# Patient Record
Sex: MALE | Race: WHITE | Employment: FULL TIME | URBAN - NONMETROPOLITAN AREA
[De-identification: names, ages, dates, MRNs, and addresses within clinical notes are randomized per-mention and may not be internally consistent; named-entity substitution may affect disease eponyms.]

---

## 2017-02-08 ENCOUNTER — OFFICE VISIT (OUTPATIENT)
Dept: PSYCHIATRY | Age: 57
End: 2017-02-08
Payer: COMMERCIAL

## 2017-02-08 VITALS
HEART RATE: 93 BPM | DIASTOLIC BLOOD PRESSURE: 67 MMHG | OXYGEN SATURATION: 95 % | HEIGHT: 67 IN | BODY MASS INDEX: 33.26 KG/M2 | SYSTOLIC BLOOD PRESSURE: 119 MMHG | WEIGHT: 211.9 LBS

## 2017-02-08 DIAGNOSIS — F31.81 BIPOLAR 2 DISORDER, MAJOR DEPRESSIVE EPISODE (HCC): Primary | ICD-10-CM

## 2017-02-08 PROCEDURE — 99214 OFFICE O/P EST MOD 30 MIN: CPT | Performed by: PSYCHIATRY & NEUROLOGY

## 2017-02-08 PROCEDURE — 90833 PSYTX W PT W E/M 30 MIN: CPT | Performed by: PSYCHIATRY & NEUROLOGY

## 2017-02-08 RX ORDER — VENLAFAXINE HYDROCHLORIDE 150 MG/1
150 CAPSULE, EXTENDED RELEASE ORAL DAILY
Qty: 30 CAPSULE | Refills: 3 | Status: SHIPPED | OUTPATIENT
Start: 2017-02-08 | End: 2017-08-16 | Stop reason: SDUPTHER

## 2017-03-31 DIAGNOSIS — F33.41 RECURRENT MAJOR DEPRESSION IN PARTIAL REMISSION (HCC): ICD-10-CM

## 2017-04-03 RX ORDER — LAMOTRIGINE 150 MG/1
TABLET ORAL
Qty: 60 TABLET | Refills: 2 | Status: SHIPPED | OUTPATIENT
Start: 2017-04-03 | End: 2017-08-17 | Stop reason: SDUPTHER

## 2017-04-05 RX ORDER — RISPERIDONE 1 MG/1
TABLET, FILM COATED ORAL
Qty: 60 TABLET | Refills: 2 | Status: SHIPPED | OUTPATIENT
Start: 2017-04-05 | End: 2017-08-17 | Stop reason: SDUPTHER

## 2017-05-08 ENCOUNTER — OFFICE VISIT (OUTPATIENT)
Dept: PSYCHIATRY | Age: 57
End: 2017-05-08
Payer: COMMERCIAL

## 2017-05-08 VITALS
DIASTOLIC BLOOD PRESSURE: 56 MMHG | HEIGHT: 67 IN | WEIGHT: 205 LBS | HEART RATE: 83 BPM | BODY MASS INDEX: 32.18 KG/M2 | SYSTOLIC BLOOD PRESSURE: 128 MMHG | OXYGEN SATURATION: 96 %

## 2017-05-08 DIAGNOSIS — F33.2 SEVERE EPISODE OF RECURRENT MAJOR DEPRESSIVE DISORDER, WITHOUT PSYCHOTIC FEATURES (HCC): Primary | ICD-10-CM

## 2017-05-08 DIAGNOSIS — F31.81 BIPOLAR 2 DISORDER, MAJOR DEPRESSIVE EPISODE (HCC): ICD-10-CM

## 2017-05-08 PROCEDURE — 90833 PSYTX W PT W E/M 30 MIN: CPT | Performed by: PSYCHIATRY & NEUROLOGY

## 2017-05-08 PROCEDURE — 99214 OFFICE O/P EST MOD 30 MIN: CPT | Performed by: PSYCHIATRY & NEUROLOGY

## 2017-05-08 RX ORDER — VENLAFAXINE HYDROCHLORIDE 150 MG/1
150 CAPSULE, EXTENDED RELEASE ORAL DAILY
Qty: 30 CAPSULE | Refills: 3 | Status: SHIPPED | OUTPATIENT
Start: 2017-05-08 | End: 2017-08-17 | Stop reason: DRUGHIGH

## 2017-08-16 ENCOUNTER — OFFICE VISIT (OUTPATIENT)
Dept: PSYCHIATRY | Age: 57
End: 2017-08-16
Payer: COMMERCIAL

## 2017-08-16 VITALS
WEIGHT: 206.5 LBS | SYSTOLIC BLOOD PRESSURE: 128 MMHG | HEART RATE: 77 BPM | HEIGHT: 67 IN | DIASTOLIC BLOOD PRESSURE: 75 MMHG | OXYGEN SATURATION: 98 % | BODY MASS INDEX: 32.41 KG/M2

## 2017-08-16 DIAGNOSIS — F33.41 RECURRENT MAJOR DEPRESSION IN PARTIAL REMISSION (HCC): ICD-10-CM

## 2017-08-16 PROCEDURE — 90832 PSYTX W PT 30 MINUTES: CPT | Performed by: NURSE PRACTITIONER

## 2017-08-17 ENCOUNTER — TELEPHONE (OUTPATIENT)
Dept: PSYCHIATRY | Age: 57
End: 2017-08-17

## 2017-08-17 RX ORDER — VENLAFAXINE HYDROCHLORIDE 150 MG/1
CAPSULE, EXTENDED RELEASE ORAL
Qty: 30 CAPSULE | Refills: 2 | Status: SHIPPED | OUTPATIENT
Start: 2017-08-17 | End: 2017-10-16 | Stop reason: SDUPTHER

## 2017-08-17 RX ORDER — LAMOTRIGINE 150 MG/1
TABLET ORAL
Qty: 60 TABLET | Refills: 2 | Status: SHIPPED | OUTPATIENT
Start: 2017-08-17 | End: 2017-10-16 | Stop reason: SDUPTHER

## 2017-08-17 RX ORDER — VENLAFAXINE HYDROCHLORIDE 75 MG/1
CAPSULE, EXTENDED RELEASE ORAL
Qty: 30 CAPSULE | Refills: 2 | Status: SHIPPED | OUTPATIENT
Start: 2017-08-17 | End: 2017-10-16 | Stop reason: SDUPTHER

## 2017-08-17 RX ORDER — CLONAZEPAM 0.5 MG/1
TABLET ORAL
Qty: 30 TABLET | Refills: 0 | Status: SHIPPED | OUTPATIENT
Start: 2017-08-17 | End: 2017-10-16

## 2017-08-17 RX ORDER — RISPERIDONE 1 MG/1
TABLET, FILM COATED ORAL
Qty: 60 TABLET | Refills: 2 | Status: SHIPPED | OUTPATIENT
Start: 2017-08-17 | End: 2017-10-16 | Stop reason: SDUPTHER

## 2017-08-30 ENCOUNTER — TELEPHONE (OUTPATIENT)
Dept: PSYCHIATRY | Age: 57
End: 2017-08-30

## 2017-10-16 ENCOUNTER — OFFICE VISIT (OUTPATIENT)
Dept: PSYCHIATRY | Age: 57
End: 2017-10-16
Payer: COMMERCIAL

## 2017-10-16 VITALS
SYSTOLIC BLOOD PRESSURE: 121 MMHG | HEIGHT: 67 IN | BODY MASS INDEX: 33.02 KG/M2 | OXYGEN SATURATION: 94 % | DIASTOLIC BLOOD PRESSURE: 77 MMHG | HEART RATE: 87 BPM | WEIGHT: 210.4 LBS

## 2017-10-16 DIAGNOSIS — F33.41 RECURRENT MAJOR DEPRESSION IN PARTIAL REMISSION (HCC): ICD-10-CM

## 2017-10-16 PROCEDURE — 99999 PR OFFICE/OUTPT VISIT,PROCEDURE ONLY: CPT | Performed by: NURSE PRACTITIONER

## 2017-10-16 PROCEDURE — 90832 PSYTX W PT 30 MINUTES: CPT | Performed by: NURSE PRACTITIONER

## 2017-10-16 RX ORDER — VENLAFAXINE HYDROCHLORIDE 150 MG/1
CAPSULE, EXTENDED RELEASE ORAL
Qty: 30 CAPSULE | Refills: 2 | Status: SHIPPED | OUTPATIENT
Start: 2017-10-16 | End: 2018-02-23 | Stop reason: SDUPTHER

## 2017-10-16 RX ORDER — RISPERIDONE 1 MG/1
TABLET, FILM COATED ORAL
Qty: 60 TABLET | Refills: 2 | Status: SHIPPED | OUTPATIENT
Start: 2017-10-16 | End: 2018-01-11 | Stop reason: SDUPTHER

## 2017-10-16 RX ORDER — VENLAFAXINE HYDROCHLORIDE 75 MG/1
CAPSULE, EXTENDED RELEASE ORAL
Qty: 30 CAPSULE | Refills: 2 | Status: SHIPPED | OUTPATIENT
Start: 2017-10-16 | End: 2018-02-23 | Stop reason: SDUPTHER

## 2017-10-16 RX ORDER — LAMOTRIGINE 150 MG/1
TABLET ORAL
Qty: 60 TABLET | Refills: 2 | Status: SHIPPED | OUTPATIENT
Start: 2017-10-16 | End: 2018-02-23 | Stop reason: SDUPTHER

## 2018-01-11 RX ORDER — RISPERIDONE 1 MG/1
TABLET, FILM COATED ORAL
Qty: 60 TABLET | Refills: 2 | Status: SHIPPED | OUTPATIENT
Start: 2018-01-11 | End: 2018-02-23 | Stop reason: SDUPTHER

## 2018-01-11 NOTE — TELEPHONE ENCOUNTER
Lamictal 150 mg bid, Risperdal 0.5 mg qam, 1.5 tabs qhs, d/c Klonopin  1. The risks, benefits, side effects, indications, contraindications, and adverse effects of the medications have been discussed. yes   2. The pt has verbalized understanding and has capacity to give informed consent. 3. The Liza Rhiannon report has been reviewed according to [facility] regulations. 4. Supportive therapy offered. 5. Follow up: Return in 3 months  6. The patient has been advised to call with any problems. 7. Controlled substance Treatment Plan: No Rx. 8. The above listed medications have been continued, modifications in meds and other orders/labs as follows:   9.  Additional comments:

## 2018-02-23 ENCOUNTER — OFFICE VISIT (OUTPATIENT)
Dept: PSYCHIATRY | Age: 58
End: 2018-02-23
Payer: COMMERCIAL

## 2018-02-23 VITALS
HEIGHT: 67 IN | HEART RATE: 84 BPM | SYSTOLIC BLOOD PRESSURE: 114 MMHG | OXYGEN SATURATION: 98 % | WEIGHT: 213 LBS | BODY MASS INDEX: 33.43 KG/M2 | DIASTOLIC BLOOD PRESSURE: 72 MMHG

## 2018-02-23 DIAGNOSIS — F31.81 BIPOLAR 2 DISORDER, MAJOR DEPRESSIVE EPISODE (HCC): Primary | ICD-10-CM

## 2018-02-23 DIAGNOSIS — F33.41 RECURRENT MAJOR DEPRESSION IN PARTIAL REMISSION (HCC): ICD-10-CM

## 2018-02-23 PROCEDURE — 99214 OFFICE O/P EST MOD 30 MIN: CPT | Performed by: NURSE PRACTITIONER

## 2018-02-23 RX ORDER — RISPERIDONE 1 MG/1
TABLET, FILM COATED ORAL
Qty: 60 TABLET | Refills: 2 | Status: SHIPPED | OUTPATIENT
Start: 2018-02-23 | End: 2018-07-26 | Stop reason: SDUPTHER

## 2018-02-23 RX ORDER — VENLAFAXINE HYDROCHLORIDE 150 MG/1
CAPSULE, EXTENDED RELEASE ORAL
Qty: 30 CAPSULE | Refills: 2 | Status: SHIPPED | OUTPATIENT
Start: 2018-02-23 | End: 2018-04-10 | Stop reason: DRUGHIGH

## 2018-02-23 RX ORDER — LAMOTRIGINE 150 MG/1
TABLET ORAL
Qty: 60 TABLET | Refills: 2 | Status: SHIPPED | OUTPATIENT
Start: 2018-02-23 | End: 2018-07-26 | Stop reason: SDUPTHER

## 2018-02-23 RX ORDER — VENLAFAXINE HYDROCHLORIDE 75 MG/1
CAPSULE, EXTENDED RELEASE ORAL
Qty: 30 CAPSULE | Refills: 2 | Status: SHIPPED | OUTPATIENT
Start: 2018-02-23 | End: 2018-04-10 | Stop reason: DRUGHIGH

## 2018-04-10 ENCOUNTER — OFFICE VISIT (OUTPATIENT)
Dept: PSYCHIATRY | Age: 58
End: 2018-04-10
Payer: COMMERCIAL

## 2018-04-10 VITALS
BODY MASS INDEX: 31.39 KG/M2 | SYSTOLIC BLOOD PRESSURE: 131 MMHG | OXYGEN SATURATION: 96 % | HEIGHT: 67 IN | DIASTOLIC BLOOD PRESSURE: 82 MMHG | WEIGHT: 200 LBS | HEART RATE: 90 BPM

## 2018-04-10 DIAGNOSIS — F31.81 BIPOLAR 2 DISORDER, MAJOR DEPRESSIVE EPISODE (HCC): Primary | ICD-10-CM

## 2018-04-10 PROCEDURE — 99214 OFFICE O/P EST MOD 30 MIN: CPT | Performed by: NURSE PRACTITIONER

## 2018-04-10 RX ORDER — VENLAFAXINE HYDROCHLORIDE 150 MG/1
CAPSULE, EXTENDED RELEASE ORAL
Qty: 60 CAPSULE | Refills: 3 | Status: SHIPPED | OUTPATIENT
Start: 2018-04-10 | End: 2018-07-26 | Stop reason: SDUPTHER

## 2018-05-23 ENCOUNTER — OFFICE VISIT (OUTPATIENT)
Dept: PSYCHIATRY | Age: 58
End: 2018-05-23
Payer: COMMERCIAL

## 2018-05-23 VITALS
SYSTOLIC BLOOD PRESSURE: 128 MMHG | WEIGHT: 208 LBS | BODY MASS INDEX: 32.65 KG/M2 | HEIGHT: 67 IN | DIASTOLIC BLOOD PRESSURE: 78 MMHG | HEART RATE: 82 BPM | OXYGEN SATURATION: 93 %

## 2018-05-23 DIAGNOSIS — F32.A DEPRESSION, UNSPECIFIED DEPRESSION TYPE: ICD-10-CM

## 2018-05-23 DIAGNOSIS — F31.81 BIPOLAR 2 DISORDER, MAJOR DEPRESSIVE EPISODE (HCC): Primary | ICD-10-CM

## 2018-05-23 PROCEDURE — 99214 OFFICE O/P EST MOD 30 MIN: CPT | Performed by: NURSE PRACTITIONER

## 2018-07-26 DIAGNOSIS — F33.41 RECURRENT MAJOR DEPRESSION IN PARTIAL REMISSION (HCC): ICD-10-CM

## 2018-07-26 RX ORDER — RISPERIDONE 1 MG/1
TABLET, FILM COATED ORAL
Qty: 60 TABLET | Refills: 2 | Status: SHIPPED | OUTPATIENT
Start: 2018-07-26 | End: 2018-11-06 | Stop reason: SDUPTHER

## 2018-07-26 RX ORDER — LAMOTRIGINE 150 MG/1
TABLET ORAL
Qty: 60 TABLET | Refills: 2 | Status: SHIPPED | OUTPATIENT
Start: 2018-07-26 | End: 2018-11-06 | Stop reason: SDUPTHER

## 2018-07-26 RX ORDER — VENLAFAXINE HYDROCHLORIDE 150 MG/1
CAPSULE, EXTENDED RELEASE ORAL
Qty: 60 CAPSULE | Refills: 3 | Status: SHIPPED | OUTPATIENT
Start: 2018-07-26 | End: 2018-11-06 | Stop reason: SDUPTHER

## 2018-07-26 NOTE — TELEPHONE ENCOUNTER
Pt is needing a refill on the following; Pt was last seen on 05/23/18 and has a follow up on 09/04/18 7/26/2018 8:33 AM   Progress Note        Hugo Gama 1960  Psychotherapy Time Spent: 24 min      Psychotherapy Topics: family, health and occupational    Chief Complaint   Patient presents with    Medication Refill         Subjective:  Patient is a 57 yo CM diagnosed with bipolar 2 disorder and presents today for follow-up. Last seen in clinic on 4/10/18 and prior records were reviewed. Patient is accompanied by wife, Clara Ivan. Today patient states, \"things are improving somewhat. It's all better. \" Patient returns for repeat labwork in June. Reports improvement in energy and sleep since beginning Vitamin D and testosterone. Patient's family is also noticing improvement. Clara Ivan said patient's daughter texted her when they went horseback riding and said \"Dad's doing better today. \" Patient says his mood worsens during the fall/winter period. Furthermore, he gets laid off in December which becomes problematic with insurance. Patient is happy with where his mood and concentration are. Sleep is good. Appetite is stable. Patient reports side effects as follows: none. No evidence of EPS, no cogwheeling or abnormal motor movements. Absent  suicidal ideation. Reports compliance with medications as good . Review of Systems - 12 point review negative   History obtained via chart review and patient  PCP is Rodrick Rubio      Current Meds:    Prior to Admission medications    Medication Sig Start Date End Date Taking?  Authorizing Provider   venlafaxine (EFFEXOR XR) 150 MG extended release capsule Take 2 capsules by mouth daily 4/10/18   HUGO Agosto   lamoTRIgine (LAMICTAL) 150 MG tablet Take 1 tab by mouth every morning and 1 tab by mouth at bedtime 2/23/18   HUGO Green - JESSIKA   risperiDONE (RISPERDAL) 1 MG tablet Take 0.5 a tab by mouth every morning and 1.5 tabs by mouth at bedtime. 2/23/18   Eliana Pickup, APRN - NP   lisinopril (PRINIVIL;ZESTRIL) 10 MG tablet Take 1 tablet by mouth daily 7/22/16   Aparna Muckle, DO   magnesium hydroxide (MILK OF MAGNESIA) 400 MG/5ML suspension Take 30 mLs by mouth daily as needed for Constipation 7/22/16   Aparna Muckle, DO   loratadine (CLARITIN) 10 MG tablet Take 1 tablet by mouth daily 6/7/16   HUGO Miller CNP   amLODIPine-benazepril (LOTREL) 2.5-10 MG per capsule Take 1 capsule by mouth daily 4/29/16   HUGO Miller CNP           MSE:  Patient is  A & O x3. Appearance:  well-appearing appropriately dressed for season and age. Cognition:  Recent memory intact , remote memory intact , good fund of knowledge, average  intelligence level. Speech:  normal  Language: Naming: intact; Word Finding: intact  Conversation no evidence of delusions  Behavior:  Cooperative and Good eye contact  Mood: euthymic  Affect: congruent with mood  Thought Content: no evidence of overt psychosis, delusional thought or suicidal /homicidal ideation or plan  Thought Process: linear, goal directed and coherent  Judgement Insight:  normal and appropriate  Gait and Station:normal gait and station and normal balance   Musculoskeletal: WNL      Assesment:   1. Recurrent major depression in partial remission ([facility] [address]            Plan:  Continue medications as prescribed. Patient will need increase in Effexor at next appointment due to SAD. 1. The risks, benefits, side effects, indications, contraindications, and adverse effects of the medications have been discussed. Yes.  2. The pt has verbalized understanding and has capacity to give informed consent. 3. The Lorel Landau report has been reviewed according to [facility] regulations. 4. Supportive therapy offered. 5. Follow up: No Follow-up on file. 6. The patient has been advised to call with any problems. 7. Controlled substance Treatment Plan.   8. The above listed medications have been continued, modifications in meds and other orders/labs as follows: No orders of the defined types were placed in this encounter. No orders of the defined types were placed in this encounter. 9. Additional comments:      CORDELL Jackson  Requested Prescriptions     Pending Prescriptions Disp Refills    lamoTRIgine (LAMICTAL) 150 MG tablet 60 tablet 2     Sig: Take 1 tab by mouth every morning and 1 tab by mouth at bedtime    risperiDONE (RISPERDAL) 1 MG tablet 60 tablet 2     Sig: Take 0.5 a tab by mouth every morning and 1.5 tabs by mouth at bedtime.     venlafaxine (EFFEXOR XR) 150 MG extended release capsule 60 capsule 3     Sig: Take 2 capsules by mouth daily

## 2018-09-04 ENCOUNTER — OFFICE VISIT (OUTPATIENT)
Dept: PSYCHIATRY | Age: 58
End: 2018-09-04
Payer: COMMERCIAL

## 2018-09-04 VITALS
OXYGEN SATURATION: 97 % | HEIGHT: 67 IN | WEIGHT: 203.5 LBS | HEART RATE: 92 BPM | DIASTOLIC BLOOD PRESSURE: 73 MMHG | BODY MASS INDEX: 31.94 KG/M2 | SYSTOLIC BLOOD PRESSURE: 110 MMHG

## 2018-09-04 DIAGNOSIS — F31.81 BIPOLAR 2 DISORDER, MAJOR DEPRESSIVE EPISODE (HCC): Primary | ICD-10-CM

## 2018-09-04 DIAGNOSIS — F33.41 RECURRENT MAJOR DEPRESSION IN PARTIAL REMISSION (HCC): ICD-10-CM

## 2018-09-04 PROCEDURE — 99214 OFFICE O/P EST MOD 30 MIN: CPT | Performed by: NURSE PRACTITIONER

## 2018-09-04 NOTE — PROGRESS NOTES
Cedrick Paige APRN - NP   lisinopril (PRINIVIL;ZESTRIL) 10 MG tablet Take 1 tablet by mouth daily 7/22/16  Yes Annette Rodriguez, DO   magnesium hydroxide (MILK OF MAGNESIA) 400 MG/5ML suspension Take 30 mLs by mouth daily as needed for Constipation 7/22/16  Yes Annette Rodriguez, DO   loratadine (CLARITIN) 10 MG tablet Take 1 tablet by mouth daily 6/7/16  Yes HUGO Castillo CNP   amLODIPine-benazepril (LOTREL) 2.5-10 MG per capsule Take 1 capsule by mouth daily 4/29/16  Yes HUGO Castillo CNP         MSE:  Patient is  A & O x3. Appearance:  well-appearing appropriately dressed for season and age. Cognition:  Recent memory intact , remote memory intact , good fund of knowledge, average  intelligence level. Speech:  normal  Language: Naming: intact; Word Finding: intact  Conversation no evidence of delusions  Behavior:  Cooperative and Good eye contact  Mood: euthymic  Affect: congruent with mood  Thought Content: no evidence of overt psychosis, delusional thought or suicidal /homicidal ideation or plan  Thought Process: linear, goal directed and coherent  Judgement Insight:  normal and appropriate  Gait and Station:normal gait and station and normal balance   Musculoskeletal: WNL      Assesment:   1. Bipolar 2 disorder, major depressive episode ([facility] [address]    2. Recurrent major depression in partial remission ([facility] [address]        Plan:  Continue medications as prescribed. 1. The risks, benefits, side effects, indications, contraindications, and adverse effects of the medications have been discussed. Yes.  2. The pt has verbalized understanding and has capacity to give informed consent. 3. The Hernandezjennifer Wayneann report has been reviewed according to [facility] regulations. 4. Supportive therapy offered. 5. Follow up: 11/6/18  6. The patient has been advised to call with any problems. 7. Controlled substance Treatment Plan.   8. The above listed medications have been continued, modifications in meds and other orders/labs as

## 2018-11-06 ENCOUNTER — OFFICE VISIT (OUTPATIENT)
Dept: PSYCHIATRY | Age: 58
End: 2018-11-06
Payer: COMMERCIAL

## 2018-11-06 DIAGNOSIS — F33.41 RECURRENT MAJOR DEPRESSION IN PARTIAL REMISSION (HCC): ICD-10-CM

## 2018-11-06 DIAGNOSIS — F31.81 BIPOLAR 2 DISORDER, MAJOR DEPRESSIVE EPISODE (HCC): Primary | ICD-10-CM

## 2018-11-06 PROCEDURE — 99214 OFFICE O/P EST MOD 30 MIN: CPT | Performed by: NURSE PRACTITIONER

## 2018-11-06 RX ORDER — VENLAFAXINE HYDROCHLORIDE 75 MG/1
75 CAPSULE, EXTENDED RELEASE ORAL DAILY
Qty: 30 CAPSULE | Refills: 5 | Status: SHIPPED | OUTPATIENT
Start: 2018-11-06

## 2018-11-06 RX ORDER — RISPERIDONE 1 MG/1
TABLET, FILM COATED ORAL
Qty: 60 TABLET | Refills: 5 | Status: SHIPPED | OUTPATIENT
Start: 2018-11-06

## 2018-11-06 RX ORDER — VENLAFAXINE HYDROCHLORIDE 150 MG/1
CAPSULE, EXTENDED RELEASE ORAL
Qty: 60 CAPSULE | Refills: 5 | Status: SHIPPED | OUTPATIENT
Start: 2018-11-06 | End: 2019-06-11 | Stop reason: SDUPTHER

## 2018-11-06 RX ORDER — LAMOTRIGINE 150 MG/1
TABLET ORAL
Qty: 60 TABLET | Refills: 5 | Status: SHIPPED | OUTPATIENT
Start: 2018-11-06 | End: 2019-05-07 | Stop reason: SDUPTHER

## 2019-02-06 ENCOUNTER — OFFICE VISIT (OUTPATIENT)
Dept: PSYCHIATRY | Age: 59
End: 2019-02-06
Payer: COMMERCIAL

## 2019-02-06 VITALS
WEIGHT: 200 LBS | BODY MASS INDEX: 31.39 KG/M2 | SYSTOLIC BLOOD PRESSURE: 137 MMHG | DIASTOLIC BLOOD PRESSURE: 89 MMHG | HEART RATE: 81 BPM | OXYGEN SATURATION: 98 % | HEIGHT: 67 IN

## 2019-02-06 DIAGNOSIS — Z86.59 HISTORY OF MANIA: ICD-10-CM

## 2019-02-06 DIAGNOSIS — F31.81 BIPOLAR 2 DISORDER (HCC): Primary | ICD-10-CM

## 2019-02-06 DIAGNOSIS — F33.8 SEASONAL AFFECTIVE DISORDER (HCC): ICD-10-CM

## 2019-02-06 DIAGNOSIS — F33.41 RECURRENT MAJOR DEPRESSION IN PARTIAL REMISSION (HCC): ICD-10-CM

## 2019-02-06 DIAGNOSIS — F32.A DEPRESSION, UNSPECIFIED DEPRESSION TYPE: ICD-10-CM

## 2019-02-06 PROCEDURE — 99214 OFFICE O/P EST MOD 30 MIN: CPT | Performed by: NURSE PRACTITIONER

## 2019-05-07 DIAGNOSIS — F33.41 RECURRENT MAJOR DEPRESSION IN PARTIAL REMISSION (HCC): ICD-10-CM

## 2019-05-07 NOTE — TELEPHONE ENCOUNTER
ideation. Reports compliance with medications as good . Review of Systems - 14 point review negative today   History obtained via chart review and patient  PCP is HUGO House CNP  /89 (Site: Right Upper Arm, Position: Sitting, Cuff Size: Medium Adult)   Pulse 81   Ht 5' 7\" (1.702 m)   Wt 200 lb (90.7 kg)   SpO2 98%   BMI 31.32 kg/m²       Current Meds:    Prior to Admission medications    Medication Sig Start Date End Date Taking? Authorizing Provider   lamoTRIgine (LAMICTAL) 150 MG tablet Take 1 tab by mouth every morning and 1 tab by mouth at bedtime 11/6/18   HUGO Clifford   risperiDONE (RISPERDAL) 1 MG tablet Take 0.5 a tab by mouth every morning and 1.5 tabs by mouth at bedtime. 11/6/18   HUGO Clifford   venlafaxine (EFFEXOR XR) 150 MG extended release capsule Take 2 capsules by mouth daily 11/6/18   HUGO Clifford   venlafaxine (EFFEXOR XR) 75 MG extended release capsule Take 1 capsule by mouth daily 11/6/18   HUGO Clifford   lisinopril (PRINIVIL;ZESTRIL) 10 MG tablet Take 1 tablet by mouth daily 7/22/16   Alo Gomes DO   magnesium hydroxide (MILK OF MAGNESIA) 400 MG/5ML suspension Take 30 mLs by mouth daily as needed for Constipation 7/22/16   Alo Gomes DO   loratadine (CLARITIN) 10 MG tablet Take 1 tablet by mouth daily 6/7/16   HUGO Doran CNP   amLODIPine-benazepril (LOTREL) 2.5-10 MG per capsule Take 1 capsule by mouth daily 4/29/16   HUGO Doran CNP      Lab Review   No visits with results within 2 Month(s) from this visit.    Latest known visit with results is:   Hospital Outpatient Visit on 01/03/2013   Component Date Value    WBC 01/03/2013 8.02     RBC 01/03/2013 5.46     Hemoglobin 01/03/2013 15.6     Hematocrit 01/03/2013 46.1     MCV 01/03/2013 84.4     MCH 01/03/2013 28.6     MCHC 01/03/2013 33.8     RDW 01/03/2013 13.3     Platelets 68/35/9408 180     MPV 01/03/2013 good hygiene appropriately dressed for season and age. Cognition:  Recent memory impaired , remote memory intact , good fund of knowledge, average  intelligence level. Speech:  normal  Language: Naming: intact; Word Finding: intact  Conversation no evidence of delusions  Behavior:  Cooperative, Good eye contact and conversational  Mood: euthymic  Affect: congruent with mood  Thought Content: no evidence of overt psychosis, delusional thought or suicidal /homicidal ideation or plan  Thought Process: linear, goal directed and coherent and associations appropriate  Concentration/ attention span: good  Judgement Insight:  normal and appropriate  Gait and Station:normal gait and station and normal balance   Musculoskeletal: WNL      Assesment:   1. Recurrent major depression in partial remission ([facility] [address]            Plan:  Continue medications as prescribed. 1. The risks, benefits, side effects, indications, contraindications, and adverse effects of the medications have been discussed. Yes.  2. The pt has verbalized understanding and has capacity to give informed consent. 3. The Cari Montes report has been reviewed according to [facility] regulations. 4. Supportive therapy offered. 5. Follow up: No follow-ups on file. 6. The patient has been advised to call with any problems. 7. Controlled substance Treatment Plan. 8. The above listed medications have been continued, modifications in meds and other orders/labs as follows: No orders of the defined types were placed in this encounter. No orders of the defined types were placed in this encounter.       9. Additional comments:      aZch Sanchez, JOEP-BC

## 2019-05-08 RX ORDER — LAMOTRIGINE 150 MG/1
TABLET ORAL
Qty: 60 TABLET | Refills: 5 | Status: SHIPPED | OUTPATIENT
Start: 2019-05-08

## 2019-06-11 RX ORDER — VENLAFAXINE HYDROCHLORIDE 150 MG/1
CAPSULE, EXTENDED RELEASE ORAL
Qty: 60 CAPSULE | Refills: 5 | Status: SHIPPED | OUTPATIENT
Start: 2019-06-11

## 2019-06-11 NOTE — TELEPHONE ENCOUNTER
Last ov 02/06/19  Next ov to be determined due to provider  Requested Prescriptions     Pending Prescriptions Disp Refills    venlafaxine (EFFEXOR XR) 150 MG extended release capsule [Pharmacy Med Name: VENLAFAXINE ER 150MG CAP] 60 capsule 5     Sig: TAKE 2 CAPSULES BY MOUTH ONCE DAILY     6/11/2019 8:29 AM   Progress Note        Camron Grace 1960  Psychotherapy Time Spent: 19 min      Psychotherapy Topics: family, health, marital and occupational    Chief Complaint   Patient presents with    Medication Refill         Subjective:  Patient is a 65 yo CF diagnosed with Bipolar 2 D/O, SAD, MDD, and recent Jhoan and presents today for follow-up. Last seen in clinic on 11/6/18 and prior records were reviewed. Patient is accompanied by wife, Ericksherri Will. At last ov, patient reported increasing seasonal depression and an \"out of the blue\" manic episode. He was prescribed an additional 75 mg of Effexor and says he has noticed improvement in seasonal symptoms of depression. Reports mood has been stable. Georgi Solis says \"he's been really good. \" Today patient states, \"I've pretty much had all good days since I saw you last. The jhoan was a blip. It helps that the winter hasn't been so bad. haven't been laid off much at work and when I have been laid off I can hunt. \" Patient reports he has a new puppy. He feels this responsibility has given him purpose and helped with mood. \"Even when I want to just lay there, I'll get up for him. \" Patient says he has been playing cards with his wife and another couple frequently. Georgi Solis thinks the socializing has been good for patient's isolative tendencies. Patient is animated and engaged in conversation today. He is spontaneous in speech and smiles throughout the appointment. Sleep is good. Appetite is stable. Patient reports side effects as follows: none. No evidence of EPS, no cogwheeling or abnormal motor movements. Absent  suicidal ideation.   Reports compliance with Lymphocytes 01/03/2013 2.45     Monocytes 01/03/2013 0.38     Eosinophils % 01/03/2013 0.18     Basophils 01/03/2013 0.04     Neutrophils % 01/03/2013 62.1     Lymphocytes 01/03/2013 30.5     Monocytes % 01/03/2013 4.7     Eosinophils % 01/03/2013 2.2     Basophils % 01/03/2013 0.5     Color, UA 01/03/2013 YELLOW     Character, Urine 01/03/2013 CLEAR     Glucose, Ur 01/03/2013 NEGATIVE     Bilirubin Urine 01/03/2013 NEGATIVE     Ketones, Urine 01/03/2013 NEGATIVE     Specific Gravity, Urine 01/03/2013 1.020     Occult Blood,Urine 01/03/2013 NEGATIVE     pH, UA 01/03/2013 6.0     Protein, UA 01/03/2013 NEGATIVE     Urobilinogen, Urine 01/03/2013 0.2     Nitrite, Urine 01/03/2013 NEGATIVE     Leukocyte Esterase, Urine 01/03/2013 NEGATIVE     Urin Manual Y/N 01/03/2013 NO     Amphetamines 01/03/2013 NEGATIVE     Barbiturates 01/03/2013 NEGATIVE     Benzodiazepines 01/03/2013 NEGATIVE     Cannabinoids 01/03/2013 NEGATIVE     Cocaine 01/03/2013 NEGATIVE     Opiate Scrn, Interp 01/03/2013 NEGATIVE     Calcium 01/03/2013 9.4     Glucose 01/03/2013 89     CREATININE 01/03/2013 1.0     Gfr Calculated 01/03/2013 84     BUN 01/03/2013 15     Total Protein 01/03/2013 7.0     Alb 01/03/2013 4.8     Total Bilirubin 01/03/2013 1.1     Alkaline Phosphatase 01/03/2013 69     AST 01/03/2013 32     Sodium 01/03/2013 141     Potassium 01/03/2013 3.9     Chloride 01/03/2013 107     CO2 01/03/2013 27     ALT 01/03/2013 38     Anion Gap 34/49/1585 7     Salicylate Lvl 06/24/6587 < 1.0     Alcohol, Ethyl (B) 01/03/2013 < 10.0     Alcohol Control 01/03/2013 271.8     Reference Range 01/03/2013 233.0-281.0     Alcohol Disinfectant Used 01/03/2013 BETADINE     Phlebotomist 01/03/2013 [id number]     Technologist Perf. Test 01/03/2013 [id number]          MSE:  Patient is  A & O x3.   Appearance:  well-appearing, street clothes, in chair, good grooming and good hygiene appropriately dressed for season and age. Cognition:  Recent memory impaired , remote memory intact , good fund of knowledge, average  intelligence level. Speech:  normal  Language: Naming: intact; Word Finding: intact  Conversation no evidence of delusions  Behavior:  Cooperative, Good eye contact and conversational  Mood: euthymic  Affect: congruent with mood  Thought Content: no evidence of overt psychosis, delusional thought or suicidal /homicidal ideation or plan  Thought Process: linear, goal directed and coherent and associations appropriate  Concentration/ attention span: good  Judgement Insight:  normal and appropriate  Gait and Station:normal gait and station and normal balance   Musculoskeletal: WNL      Assesment:   No diagnosis found. Plan:  Continue medications as prescribed. 1. The risks, benefits, side effects, indications, contraindications, and adverse effects of the medications have been discussed. Yes.  2. The pt has verbalized understanding and has capacity to give informed consent. 3. The [facility] report has been reviewed according to [facility] regulations. 4. Supportive therapy offered. 5. Follow up: No follow-ups on file. 6. The patient has been advised to call with any problems. 7. Controlled substance Treatment Plan. 8. The above listed medications have been continued, modifications in meds and other orders/labs as follows: No orders of the defined types were placed in this encounter. No orders of the defined types were placed in this encounter.       9. Additional comments:      Doris Villegas, GLENNY-BC

## 2023-06-08 ENCOUNTER — OFFICE VISIT (OUTPATIENT)
Dept: FAMILY MEDICINE CLINIC | Age: 63
End: 2023-06-08
Payer: COMMERCIAL

## 2023-06-08 VITALS
TEMPERATURE: 97.8 F | HEIGHT: 67 IN | SYSTOLIC BLOOD PRESSURE: 120 MMHG | HEART RATE: 106 BPM | BODY MASS INDEX: 33.27 KG/M2 | OXYGEN SATURATION: 97 % | DIASTOLIC BLOOD PRESSURE: 80 MMHG | WEIGHT: 212 LBS

## 2023-06-08 DIAGNOSIS — J01.90 ACUTE BACTERIAL SINUSITIS: Primary | ICD-10-CM

## 2023-06-08 DIAGNOSIS — B96.89 ACUTE BACTERIAL SINUSITIS: Primary | ICD-10-CM

## 2023-06-08 PROCEDURE — 99203 OFFICE O/P NEW LOW 30 MIN: CPT | Performed by: NURSE PRACTITIONER

## 2023-06-08 RX ORDER — ROSUVASTATIN CALCIUM 10 MG/1
10 TABLET, COATED ORAL NIGHTLY
COMMUNITY
Start: 2023-03-22

## 2023-06-08 RX ORDER — BUPROPION HYDROCHLORIDE 150 MG/1
150 TABLET ORAL EVERY 24 HOURS
COMMUNITY
Start: 2023-05-09

## 2023-06-08 RX ORDER — FLUTICASONE PROPIONATE 50 MCG
2 SPRAY, SUSPENSION (ML) NASAL DAILY
Qty: 16 G | Refills: 5 | Status: SHIPPED | OUTPATIENT
Start: 2023-06-08

## 2023-06-08 RX ORDER — ERGOCALCIFEROL 1.25 MG/1
50000 CAPSULE ORAL WEEKLY
COMMUNITY
Start: 2023-05-15

## 2023-06-08 RX ORDER — BUSPIRONE HYDROCHLORIDE 10 MG/1
10 TABLET ORAL 3 TIMES DAILY
COMMUNITY
Start: 2023-04-05

## 2023-06-08 RX ORDER — CEFDINIR 300 MG/1
300 CAPSULE ORAL 2 TIMES DAILY
Qty: 20 CAPSULE | Refills: 0 | Status: SHIPPED | OUTPATIENT
Start: 2023-06-08 | End: 2023-06-18

## 2023-06-08 RX ORDER — TESTOSTERONE CYPIONATE 200 MG/ML
1.5 INJECTION, SOLUTION INTRAMUSCULAR
COMMUNITY

## 2023-06-08 RX ORDER — FAMOTIDINE 20 MG/1
20 TABLET, FILM COATED ORAL DAILY
COMMUNITY
Start: 2023-06-06

## 2023-06-08 ASSESSMENT — ENCOUNTER SYMPTOMS
COUGH: 1
SORE THROAT: 0
EYE ITCHING: 1
SINUS PRESSURE: 1

## 2023-06-08 NOTE — PROGRESS NOTES
Adina Brown (:  1960) is a 63 y.o. male,New patient, here for evaluation of the following chief complaint(s):  Headache, Eye Pain, and Dental Pain      ASSESSMENT/PLAN:    ICD-10-CM    1. Acute bacterial sinusitis  J01.90 fluticasone (FLONASE) 50 MCG/ACT nasal spray    B96.89 cefdinir (OMNICEF) 300 MG capsule  Recommend saline nasal rinses each day after he mows  Continue Claritin          Return if symptoms worsen or fail to improve. SUBJECTIVE/OBJECTIVE:  HPI    He mows for a living. \"I have a dull headache. \"  Reports facial headache. \"My eyes feel stuck together in morning but there is nothing there. \"   \"My eyes burn and water real bad. \"  \"My jaws and teeth hurt. \"  \"I don't blow anything out of my nose. \"  Denies sore throat  Reports cough  Reports bilateral sinus pressure    Symptoms started several weeks ago    Reports increased snoring. /80   Pulse (!) 106   Temp 97.8 °F (36.6 °C) (Temporal)   Ht 5' 7\" (1.702 m)   Wt 212 lb (96.2 kg)   SpO2 97%   BMI 33.20 kg/m²     Review of Systems   Constitutional:  Positive for fatigue. Negative for fever. HENT:  Positive for congestion and sinus pressure. Negative for sore throat. Eyes:  Positive for itching. Respiratory:  Positive for cough. Neurological:  Positive for headaches (dull headache). Physical Exam  Vitals reviewed. Constitutional:       Appearance: He is well-developed. HENT:      Head: Normocephalic. Right Ear: Tympanic membrane and external ear normal.      Left Ear: Tympanic membrane and external ear normal.      Nose: Congestion present. Right Sinus: Maxillary sinus tenderness and frontal sinus tenderness present. Left Sinus: Maxillary sinus tenderness and frontal sinus tenderness present. Mouth/Throat:      Pharynx: Posterior oropharyngeal erythema (PND) present. Cardiovascular:      Rate and Rhythm: Normal rate and regular rhythm.    Pulmonary:      Effort: Pulmonary effort is

## 2024-04-13 ENCOUNTER — HOSPITAL ENCOUNTER (EMERGENCY)
Facility: HOSPITAL | Age: 64
Discharge: HOME | End: 2024-04-13
Attending: EMERGENCY MEDICINE
Payer: COMMERCIAL

## 2024-04-13 ENCOUNTER — APPOINTMENT (OUTPATIENT)
Dept: CT IMAGING | Facility: HOSPITAL | Age: 64
End: 2024-04-13
Payer: COMMERCIAL

## 2024-04-13 VITALS
BODY MASS INDEX: 31.37 KG/M2 | SYSTOLIC BLOOD PRESSURE: 142 MMHG | TEMPERATURE: 97.7 F | HEIGHT: 68 IN | HEART RATE: 77 BPM | WEIGHT: 207 LBS | OXYGEN SATURATION: 96 % | DIASTOLIC BLOOD PRESSURE: 91 MMHG | RESPIRATION RATE: 18 BRPM

## 2024-04-13 DIAGNOSIS — R51.9 ACUTE NONINTRACTABLE HEADACHE, UNSPECIFIED HEADACHE TYPE: Primary | ICD-10-CM

## 2024-04-13 LAB
ALBUMIN SERPL-MCNC: 4.8 G/DL (ref 3.5–5.2)
ALBUMIN/GLOB SERPL: 1.9 G/DL
ALP SERPL-CCNC: 73 U/L (ref 39–117)
ALT SERPL W P-5'-P-CCNC: 40 U/L (ref 1–41)
ANION GAP SERPL CALCULATED.3IONS-SCNC: 12 MMOL/L (ref 5–15)
AST SERPL-CCNC: 27 U/L (ref 1–40)
BASOPHILS # BLD AUTO: 0.04 10*3/MM3 (ref 0–0.2)
BASOPHILS NFR BLD AUTO: 0.5 % (ref 0–1.5)
BILIRUB SERPL-MCNC: 0.9 MG/DL (ref 0–1.2)
BUN SERPL-MCNC: 15 MG/DL (ref 8–23)
BUN/CREAT SERPL: 15.2 (ref 7–25)
CALCIUM SPEC-SCNC: 9.6 MG/DL (ref 8.6–10.5)
CHLORIDE SERPL-SCNC: 103 MMOL/L (ref 98–107)
CO2 SERPL-SCNC: 25 MMOL/L (ref 22–29)
CREAT SERPL-MCNC: 0.99 MG/DL (ref 0.76–1.27)
CRP SERPL-MCNC: <0.3 MG/DL (ref 0–0.5)
DEPRECATED RDW RBC AUTO: 41.5 FL (ref 37–54)
EGFRCR SERPLBLD CKD-EPI 2021: 86.7 ML/MIN/1.73
EOSINOPHIL # BLD AUTO: 0.12 10*3/MM3 (ref 0–0.4)
EOSINOPHIL NFR BLD AUTO: 1.6 % (ref 0.3–6.2)
ERYTHROCYTE [DISTWIDTH] IN BLOOD BY AUTOMATED COUNT: 13.4 % (ref 12.3–15.4)
ERYTHROCYTE [SEDIMENTATION RATE] IN BLOOD: 5 MM/HR (ref 0–20)
GLOBULIN UR ELPH-MCNC: 2.5 GM/DL
GLUCOSE SERPL-MCNC: 105 MG/DL (ref 65–99)
HCT VFR BLD AUTO: 46.9 % (ref 37.5–51)
HGB BLD-MCNC: 15.8 G/DL (ref 13–17.7)
IMM GRANULOCYTES # BLD AUTO: 0.02 10*3/MM3 (ref 0–0.05)
IMM GRANULOCYTES NFR BLD AUTO: 0.3 % (ref 0–0.5)
LYMPHOCYTES # BLD AUTO: 2.46 10*3/MM3 (ref 0.7–3.1)
LYMPHOCYTES NFR BLD AUTO: 32.5 % (ref 19.6–45.3)
MCH RBC QN AUTO: 29 PG (ref 26.6–33)
MCHC RBC AUTO-ENTMCNC: 33.7 G/DL (ref 31.5–35.7)
MCV RBC AUTO: 86.2 FL (ref 79–97)
MONOCYTES # BLD AUTO: 0.43 10*3/MM3 (ref 0.1–0.9)
MONOCYTES NFR BLD AUTO: 5.7 % (ref 5–12)
NEUTROPHILS NFR BLD AUTO: 4.49 10*3/MM3 (ref 1.7–7)
NEUTROPHILS NFR BLD AUTO: 59.4 % (ref 42.7–76)
NRBC BLD AUTO-RTO: 0 /100 WBC (ref 0–0.2)
PLATELET # BLD AUTO: 170 10*3/MM3 (ref 140–450)
PMV BLD AUTO: 8.2 FL (ref 6–12)
POTASSIUM SERPL-SCNC: 4.1 MMOL/L (ref 3.5–5.2)
PROT SERPL-MCNC: 7.3 G/DL (ref 6–8.5)
RBC # BLD AUTO: 5.44 10*6/MM3 (ref 4.14–5.8)
SODIUM SERPL-SCNC: 140 MMOL/L (ref 136–145)
WBC NRBC COR # BLD AUTO: 7.56 10*3/MM3 (ref 3.4–10.8)

## 2024-04-13 PROCEDURE — 96374 THER/PROPH/DIAG INJ IV PUSH: CPT

## 2024-04-13 PROCEDURE — 70486 CT MAXILLOFACIAL W/O DYE: CPT

## 2024-04-13 PROCEDURE — 85652 RBC SED RATE AUTOMATED: CPT | Performed by: EMERGENCY MEDICINE

## 2024-04-13 PROCEDURE — 25810000003 SODIUM CHLORIDE 0.9 % SOLUTION: Performed by: EMERGENCY MEDICINE

## 2024-04-13 PROCEDURE — 99284 EMERGENCY DEPT VISIT MOD MDM: CPT

## 2024-04-13 PROCEDURE — 96375 TX/PRO/DX INJ NEW DRUG ADDON: CPT

## 2024-04-13 PROCEDURE — 86140 C-REACTIVE PROTEIN: CPT | Performed by: EMERGENCY MEDICINE

## 2024-04-13 PROCEDURE — 25010000002 KETOROLAC TROMETHAMINE PER 15 MG: Performed by: EMERGENCY MEDICINE

## 2024-04-13 PROCEDURE — 25010000002 PROCHLORPERAZINE 10 MG/2ML SOLUTION: Performed by: EMERGENCY MEDICINE

## 2024-04-13 PROCEDURE — 85025 COMPLETE CBC W/AUTO DIFF WBC: CPT | Performed by: EMERGENCY MEDICINE

## 2024-04-13 PROCEDURE — 80053 COMPREHEN METABOLIC PANEL: CPT | Performed by: EMERGENCY MEDICINE

## 2024-04-13 RX ORDER — KETOROLAC TROMETHAMINE 30 MG/ML
30 INJECTION, SOLUTION INTRAMUSCULAR; INTRAVENOUS ONCE
Status: COMPLETED | OUTPATIENT
Start: 2024-04-13 | End: 2024-04-13

## 2024-04-13 RX ORDER — BUTALBITAL, ACETAMINOPHEN AND CAFFEINE 50; 325; 40 MG/1; MG/1; MG/1
1 TABLET ORAL EVERY 6 HOURS PRN
Qty: 10 TABLET | Refills: 0 | Status: SHIPPED | OUTPATIENT
Start: 2024-04-13

## 2024-04-13 RX ORDER — PROCHLORPERAZINE EDISYLATE 5 MG/ML
10 INJECTION INTRAMUSCULAR; INTRAVENOUS ONCE
Status: COMPLETED | OUTPATIENT
Start: 2024-04-13 | End: 2024-04-13

## 2024-04-13 RX ADMIN — SODIUM CHLORIDE 1000 ML: 9 INJECTION, SOLUTION INTRAVENOUS at 22:10

## 2024-04-13 RX ADMIN — KETOROLAC TROMETHAMINE 30 MG: 30 INJECTION, SOLUTION INTRAMUSCULAR; INTRAVENOUS at 22:10

## 2024-04-13 RX ADMIN — PROCHLORPERAZINE EDISYLATE 10 MG: 5 INJECTION INTRAMUSCULAR; INTRAVENOUS at 22:10

## 2024-04-14 NOTE — ED PROVIDER NOTES
Subjective   History of Present Illness  Pt presents to the  with report of bilateral retro-orbital pain that radiates to maxillary regions bilaterally for approx 1mo - worse today per report.  Denies any dental pain. No injuries.  No n/v/f/c. No cough/congestion.  Denies any nosebleeds.  Reports that he had CT on 11Apr of head that showed some atrophy c/w old injury.  No vision/speech issues.  No numb/tingling. Reportedly has seen eye doctor and had unremarkable eval        Review of Systems   Constitutional:  Negative for fever.   HENT:  Positive for sinus pressure and sinus pain. Negative for dental problem, facial swelling, rhinorrhea and sore throat.    Eyes:  Positive for pain. Negative for photophobia and redness.   Respiratory:  Negative for cough and shortness of breath.    Cardiovascular:  Negative for chest pain.   Gastrointestinal:  Negative for nausea and vomiting.   Skin:  Negative for rash.   Neurological:  Positive for headaches. Negative for seizures.   Psychiatric/Behavioral:  Positive for confusion.    All other systems reviewed and are negative.      No past medical history on file.    Allergies   Allergen Reactions    Penicillins Rash       No past surgical history on file.    No family history on file.    Social History     Socioeconomic History    Marital status:            Objective   Physical Exam  Vitals and nursing note reviewed.   Constitutional:       General: He is not in acute distress.     Appearance: Normal appearance.   HENT:      Head: Normocephalic and atraumatic.      Comments: TTP over bilat maxillary regions.  No fullness/erythema.     Nose: Nose normal.      Mouth/Throat:      Mouth: Mucous membranes are moist.   Eyes:      Extraocular Movements: Extraocular movements intact.      Conjunctiva/sclera: Conjunctivae normal.      Pupils: Pupils are equal, round, and reactive to light.   Cardiovascular:      Rate and Rhythm: Normal rate and regular rhythm.      Pulses:  Normal pulses.      Heart sounds: Normal heart sounds.   Pulmonary:      Effort: Pulmonary effort is normal.      Breath sounds: Normal breath sounds.   Abdominal:      General: Abdomen is flat.      Palpations: Abdomen is soft.   Musculoskeletal:      Cervical back: Normal range of motion and neck supple.   Skin:     General: Skin is warm and dry.      Capillary Refill: Capillary refill takes less than 2 seconds.   Neurological:      General: No focal deficit present.      Mental Status: He is alert and oriented to person, place, and time.      Cranial Nerves: No cranial nerve deficit.      Sensory: No sensory deficit.      Motor: No weakness.      Coordination: Coordination normal.         Procedures           ED Course      Labs Reviewed   COMPREHENSIVE METABOLIC PANEL - Abnormal; Notable for the following components:       Result Value    Glucose 105 (*)     All other components within normal limits    Narrative:     GFR Normal >60  Chronic Kidney Disease <60  Kidney Failure <15     C-REACTIVE PROTEIN - Normal   SEDIMENTATION RATE - Normal   CBC WITH AUTO DIFFERENTIAL - Normal   CBC AND DIFFERENTIAL    Narrative:     The following orders were created for panel order CBC & Differential.  Procedure                               Abnormality         Status                     ---------                               -----------         ------                     CBC Auto Differential[[id number]]         Normal              Final result                 Please view results for these tests on the individual orders.     CT Maxillofacial Without Contrast   Final Result   No acute findings.       This report was signed and finalized on 4/13/2024 9:49 PM by Dr. Santi Fan MD.                                                     Medical Decision Making  Pt stable in EC - no ICH.  No evid of CNS infection.  No neuro deficits. No evid of sinus infection/mass.  Unclear etiology of his pain.  Does not appear to be d/t GCA.   Will d/c with fioricet - recommend outpt f/u with neuro.  Prec given    Amount and/or Complexity of Data Reviewed  Labs: ordered.  Radiology: ordered.    Risk  Prescription drug management.        Final diagnoses:   Acute nonintractable headache, unspecified headache type       ED Disposition  ED Disposition       ED Disposition   Discharge    Condition   Stable    Comment   --               Huang Poornima K, APRN  [address] [facility] [address] [phone]               Medication List        New Prescriptions      butalbital-acetaminophen-caffeine -40 MG per tablet  Commonly known as: FIORICET, ESGIC  Take 1 tablet by mouth Every 6 (Six) Hours As Needed for Headache.               Where to Get Your Medications        These medications were sent to [facility] [address] [place] [address] - [address] [place] [address] - [phone] [facility] [phone] [address] [address]      Phone: [phone]   butalbital-acetaminophen-caffeine -40 MG per tablet            Elliott Brush,   04/13/24 2122       Elliott Brush,   04/13/24 2307

## 2024-05-13 ENCOUNTER — DOCUMENTATION (OUTPATIENT)
Dept: INTERNAL MEDICINE | Facility: CLINIC | Age: 64
End: 2024-05-13
Payer: COMMERCIAL

## 2024-05-17 ENCOUNTER — OFFICE VISIT (OUTPATIENT)
Dept: NEUROLOGY | Facility: CLINIC | Age: 64
End: 2024-05-17
Payer: COMMERCIAL

## 2024-05-17 VITALS
BODY MASS INDEX: 31.37 KG/M2 | OXYGEN SATURATION: 98 % | SYSTOLIC BLOOD PRESSURE: 180 MMHG | WEIGHT: 207 LBS | DIASTOLIC BLOOD PRESSURE: 98 MMHG | HEIGHT: 68 IN | HEART RATE: 81 BPM

## 2024-05-17 DIAGNOSIS — F45.8 TEETH GRINDING: ICD-10-CM

## 2024-05-17 DIAGNOSIS — G47.9 SLEEP DISTURBANCE: ICD-10-CM

## 2024-05-17 DIAGNOSIS — G89.29 CHRONIC INTRACTABLE HEADACHE, UNSPECIFIED HEADACHE TYPE: Primary | ICD-10-CM

## 2024-05-17 DIAGNOSIS — R51.9 CHRONIC INTRACTABLE HEADACHE, UNSPECIFIED HEADACHE TYPE: Primary | ICD-10-CM

## 2024-05-17 PROBLEM — J30.2 SEASONAL ALLERGIES: Status: ACTIVE | Noted: 2024-05-17

## 2024-05-17 PROBLEM — R09.81 CONGESTION OF NASAL SINUS: Status: ACTIVE | Noted: 2024-05-17

## 2024-05-17 PROBLEM — N18.9 CHRONIC KIDNEY DISEASE: Status: ACTIVE | Noted: 2024-05-17

## 2024-05-17 PROBLEM — E78.5 HYPERLIPIDEMIA: Status: ACTIVE | Noted: 2024-05-17

## 2024-05-17 PROBLEM — F41.1 GAD (GENERALIZED ANXIETY DISORDER): Status: ACTIVE | Noted: 2024-05-17

## 2024-05-17 PROBLEM — F41.9 ANXIETY: Status: ACTIVE | Noted: 2024-05-17

## 2024-05-17 PROBLEM — S06.9XAA TRAUMATIC BRAIN INJURY: Status: ACTIVE | Noted: 2024-05-17

## 2024-05-17 PROBLEM — E29.1 HYPOGONADISM IN MALE: Status: ACTIVE | Noted: 2024-05-17

## 2024-05-17 PROBLEM — S76.119A STRAIN OF QUADRICEPS TENDON: Status: ACTIVE | Noted: 2024-05-17

## 2024-05-17 PROBLEM — F39 MOOD DISORDER: Status: ACTIVE | Noted: 2024-05-17

## 2024-05-17 PROBLEM — I10 HYPERTENSION: Status: ACTIVE | Noted: 2024-05-17

## 2024-05-17 PROBLEM — F32.A DEPRESSIVE DISORDER: Status: ACTIVE | Noted: 2024-05-17

## 2024-05-17 PROBLEM — N28.9 LOW KIDNEY FUNCTION: Status: ACTIVE | Noted: 2024-05-17

## 2024-05-17 PROBLEM — M17.9 OSTEOARTHRITIS OF KNEE: Status: ACTIVE | Noted: 2024-05-17

## 2024-05-17 PROBLEM — J32.9 SINUSITIS: Status: ACTIVE | Noted: 2024-05-17

## 2024-05-17 PROCEDURE — 99204 OFFICE O/P NEW MOD 45 MIN: CPT | Performed by: NURSE PRACTITIONER

## 2024-05-17 RX ORDER — TESTOSTERONE CYPIONATE 200 MG/ML
1 INJECTION, SOLUTION INTRAMUSCULAR
COMMUNITY
Start: 2024-05-01

## 2024-05-17 RX ORDER — DIVALPROEX SODIUM 500 MG/1
500 TABLET, EXTENDED RELEASE ORAL DAILY
Qty: 30 TABLET | Refills: 5 | Status: SHIPPED | OUTPATIENT
Start: 2024-05-17

## 2024-05-17 RX ORDER — CYCLOBENZAPRINE HCL 5 MG
5 TABLET ORAL AS NEEDED
COMMUNITY
Start: 2024-05-03

## 2024-05-17 RX ORDER — BUPROPION HYDROCHLORIDE 150 MG/1
150 TABLET ORAL EVERY MORNING
COMMUNITY
Start: 2024-04-05

## 2024-05-17 RX ORDER — BUSPIRONE HYDROCHLORIDE 10 MG/1
10 TABLET ORAL 3 TIMES DAILY
COMMUNITY
Start: 2024-04-05

## 2024-05-17 RX ORDER — ERGOCALCIFEROL 1.25 MG/1
1 CAPSULE ORAL WEEKLY
COMMUNITY
Start: 2024-05-04

## 2024-05-17 RX ORDER — AMLODIPINE BESYLATE AND BENAZEPRIL HYDROCHLORIDE 5; 20 MG/1; MG/1
1 CAPSULE ORAL DAILY
COMMUNITY
Start: 2024-03-12

## 2024-05-17 RX ORDER — ROSUVASTATIN CALCIUM 10 MG/1
10 TABLET, COATED ORAL NIGHTLY
COMMUNITY

## 2024-05-17 RX ORDER — DIAZEPAM 2 MG/1
2 TABLET ORAL EVERY 6 HOURS PRN
COMMUNITY

## 2024-05-17 RX ORDER — METOPROLOL SUCCINATE 50 MG/1
1 TABLET, EXTENDED RELEASE ORAL DAILY
COMMUNITY
Start: 2024-05-03

## 2024-05-17 RX ORDER — FAMOTIDINE 20 MG/1
1 TABLET, FILM COATED ORAL DAILY
COMMUNITY
Start: 2024-02-10

## 2024-05-17 RX ORDER — CLINDAMYCIN HYDROCHLORIDE 150 MG/1
1 CAPSULE ORAL EVERY 6 HOURS
COMMUNITY
Start: 2024-05-07

## 2024-05-17 NOTE — LETTER
May 17, 2024       No Recipients    Patient: Mariano Al   YOB: 1960   Date of Visit: 5/17/2024     Dear Juan Guadarrama MD:       Thank you for referring Mariano Al to me for evaluation. Below are the relevant portions of my assessment and plan of care.    If you have questions, please do not hesitate to call me. I look forward to following Mariano along with you.         Sincerely,        GREY Morel        CC:   No Recipients    Kevin Wood APRN  05/17/24 1028  Sign when Signing Visit      Neurology Consult Note    Referring Provider:   GREY Hutchinson     Reason for Consultation:    Headache    Subjective  History of Present Illness:  Mariano Al is a 63 y.o. male who presents today for headache.  He is routinely followed by Poornima Encinas APRN for primary care.     He notes that he had a bout of headaches similar to this last summer that eased up.  He notes that for the past 5-6 months they have really recurred and gotten worse.  He notes that he always keeps a headache to some degre but it will worsen intermittently.  He can poorly describe how often he has headache or whenever it is worsened versus improved.  He estimates that maybe 3 to 4 days out of the week he will have a more severe headache.  He indicates that he has aching and throbbing behind the left eye primarily but it worsened to being behind both eyes.  He also indicates that he has pain in his jaws bilaterally.  He has recently seen a dentist who has pulled some teeth but also indicated he had teeth grinding.  He now has a mouthguard.  He denies any associated symptoms such as photophobia, phonophobia, dizziness, blurred vision, or nausea.    He denies history of obstructive sleep apnea.  He has very heavy snoring every night.  He will awaken multiple times per night.  He denies any specific daytime hypersomnolence but has had this, as well as nonrestorative sleep, in the past.  He will have some  difficulties with initiation of sleep at times.      Allergies:    Penicillins    Medications:  Current Outpatient Medications   Medication Sig Dispense Refill   • amLODIPine-benazepril (LOTREL 5-20) 5-20 MG per capsule Take 1 capsule by mouth Daily.     • buPROPion XL (WELLBUTRIN XL) 150 MG 24 hr tablet Take 1 tablet by mouth Every Morning.     • busPIRone (BUSPAR) 10 MG tablet Take 1 tablet by mouth 3 (Three) Times a Day.     • butalbital-acetaminophen-caffeine (FIORICET, ESGIC) -40 MG per tablet Take 1 tablet by mouth Every 6 (Six) Hours As Needed for Headache. 10 tablet 0   • clindamycin (CLEOCIN) 150 MG capsule Take 1 capsule by mouth Every 6 (Six) Hours.     • cyclobenzaprine (FLEXERIL) 5 MG tablet Take 1 tablet by mouth As Needed for Muscle Spasms.     • diazePAM (VALIUM) 2 MG tablet Take 1 tablet by mouth Every 6 (Six) Hours As Needed for Anxiety.     • famotidine (PEPCID) 20 MG tablet Take 1 tablet by mouth Daily.     • metoprolol succinate XL (TOPROL-XL) 50 MG 24 hr tablet Take 1 tablet by mouth Daily.     • multivitamin with minerals (MENS MULTIVITAMIN PO) Take 1 tablet by mouth Daily.     • rosuvastatin (CRESTOR) 10 MG tablet Take 1 tablet by mouth Every Night.     • sertraline (ZOLOFT) 50 MG tablet Take 1 tablet by mouth Daily.     • Testosterone Cypionate (DEPOTESTOTERONE CYPIONATE) 200 MG/ML injection Inject 1 mL into the appropriate muscle as directed by prescriber Every 14 (Fourteen) Days.     • vitamin D (ERGOCALCIFEROL) 1.25 MG (47637 UT) capsule capsule Take 1 capsule by mouth 1 (One) Time Per Week.     • divalproex (DEPAKOTE ER) 500 MG 24 hr tablet Take 1 tablet by mouth Daily. 30 tablet 5     No current facility-administered medications for this visit.     Current outpatient and discharge medications have been reconciled for the patient.  Reviewed by: GREY Morel    Past Medical History:  History reviewed. No pertinent past medical history.  History reviewed. No pertinent  "surgical history.  History reviewed. No pertinent family history.     Review of Systems   Neurological:  Positive for headache.         Objective  Vital Signs:  Heart Rate:  [81] 81  BP: (180)/(98) 180/98      05/17/24  0834   Weight: 93.9 kg (207 lb)     172.7 cm (68\")  Body mass index is 31.47 kg/m².    Physical Exam  Vitals reviewed.   Constitutional:       Appearance: Normal appearance.   HENT:      Head: Normocephalic.      Mouth/Throat:      Pharynx: Oropharynx is clear.   Eyes:      General: Lids are normal.      Extraocular Movements: Extraocular movements intact.      Pupils: Pupils are equal, round, and reactive to light.   Cardiovascular:      Rate and Rhythm: Normal rate and regular rhythm.      Pulses: Normal pulses.   Pulmonary:      Effort: Pulmonary effort is normal.   Musculoskeletal:         General: Normal range of motion.      Cervical back: Normal range of motion and neck supple.   Skin:     General: Skin is warm and dry.      Capillary Refill: Capillary refill takes less than 2 seconds.   Neurological:      Motor: Motor strength is normal.     Coordination: Coordination is intact.      Deep Tendon Reflexes: Reflexes are normal and symmetric.   Psychiatric:         Mood and Affect: Mood normal.         Speech: Speech normal.       Neurological Exam  Mental Status  Awake, alert and oriented to person, place and time. Recent and remote memory are intact. Speech is normal. Language is fluent with no aphasia. Attention and concentration are normal.    Cranial Nerves  CN II: Visual acuity is normal. Visual fields full to confrontation.  CN III, IV, VI: Extraocular movements intact bilaterally. Normal lids and orbits bilaterally. Pupils equal round and reactive to light bilaterally.  CN V: Facial sensation is normal.  CN VII: Full and symmetric facial movement.  CN IX, X: Palate elevates symmetrically. Normal gag reflex.  CN XI: Shoulder shrug strength is normal.  CN XII: Tongue midline without " "atrophy or fasciculations.    Motor   Strength is 5/5 throughout all four extremities.    Sensory  Sensation is intact to light touch, pinprick, vibration and proprioception in all four extremities.    Reflexes  Deep tendon reflexes are 2+ and symmetric in all four extremities.    Coordination    Finger-to-nose, rapid alternating movements and heel-to-shin normal bilaterally without dysmetria.    Gait  Normal casual, toe, heel and tandem gait.    Results Review:    Lab Results   Component Value Date    GLUCOSE 105 (H) 04/13/2024    BUN 15 04/13/2024    CREATININE 0.99 04/13/2024    BCR 15.2 04/13/2024    K 4.1 04/13/2024    CO2 25.0 04/13/2024    CALCIUM 9.6 04/13/2024    ALBUMIN 4.8 04/13/2024    AST 27 04/13/2024    ALT 40 04/13/2024     Lab Results   Component Value Date    WBC 7.56 04/13/2024    HGB 15.8 04/13/2024    HCT 46.9 04/13/2024    MCV 86.2 04/13/2024     04/13/2024     No results found for: \"CHOL\", \"CHLPL\", \"TRIG\", \"HDL\", \"LDL\", \"LDLDIRECT\"  No results found for: \"TSH\"  No results found for: \"HGBA1C\"  No results found for: \"FOLATE\"  No results found for: \"KISVOPSA26\"    IMAGING SCANNED (04/11/2024)  CT Maxillofacial Without Contrast (04/13/2024 21:36)    Chart Review:  ED with Elliott Brush DO (04/13/2024)      Plan  Diagnoses and all orders for this visit:    1. Chronic intractable headache, unspecified headache type (Primary)  Assessment & Plan:  He has been having the onset of headaches that are being worsening for the past 4 to 6 months.  However, about a year ago he had the onset of this very similar headache but it was much milder in nature.  He primarily complains of pain in his jaws and behind his eyes bilaterally.  There are some days that is worse than others but primarily he notes a dull aching and throbbing pain nearly consistently.  He has no associated symptoms with this.  He has been tried and failed on a couple different medications to include metoprolol, Fioricet, " Imitrex, and tramadol.  He is also attempted Tylenol.  He cannot take ibuprofen secondary to his kidney function.    At this point his symptoms not consistent with anything such as migraine as he has no migrainous features along with a headache that is not typically in a correct presentation for migraine.  I do not believe this is a primary headache disorder.  I believe that he is having secondary headaches as a result of something such as sleep apnea has he does have a sleep disturbance as described below.  I believe that he has having clenching and grinding of his jaw that is causing some TMJ pain that is then referring to headache.  He notes that he has been started on Flexeril recently and over the past couple days he has had less pain although it is still there.    We will proceed with continue with Flexeril.  Have advised he take this nightly to see if we could help reduce the amount of muscle tension in his jaws and overall improve his pain.  Will also attempt Depakote 500 mg daily to see how this helps with the headache.  I have discussed with him to call me with any questions or concerns or worsening symptoms.  Just for thorough evaluation, we will go ahead and check MRI of the brain without contrast as he has had significant worsening over the past half a year.    Orders:  -     divalproex (DEPAKOTE ER) 500 MG 24 hr tablet; Take 1 tablet by mouth Daily.  Dispense: 30 tablet; Refill: 5  -     MRI Brain Without Contrast; Future    2. Sleep disturbance  Assessment & Plan:  At this time believe that he has symptoms of sleep determinants that are consistent with his obstructive sleep apnea.  He indicates that he has had daytime hypersomnolence in the past but this is not overwhelming at this point.  However, he does have grinding of his teeth, extreme snoring, pauses in his breathing, and multiple awakenings at night that are concerning.  We will test with a home sleep study to determine if he has obstructive  sleep apnea.  Discussed this in detail with him and his wife today.    Orders:  -     Home Sleep Study; Future    3. Teeth grinding  -     Home Sleep Study; Future      Follow-Up:  Return in about 1 month (around 6/17/2024) for Headache.         Kevin Wood, GREY  05/17/24  10:24 CDT

## 2024-05-17 NOTE — ASSESSMENT & PLAN NOTE
He has been having the onset of headaches that are being worsening for the past 4 to 6 months.  However, about a year ago he had the onset of this very similar headache but it was much milder in nature.  He primarily complains of pain in his jaws and behind his eyes bilaterally.  There are some days that is worse than others but primarily he notes a dull aching and throbbing pain nearly consistently.  He has no associated symptoms with this.  He has been tried and failed on a couple different medications to include metoprolol, Fioricet, Imitrex, and tramadol.  He is also attempted Tylenol.  He cannot take ibuprofen secondary to his kidney function.    At this point his symptoms not consistent with anything such as migraine as he has no migrainous features along with a headache that is not typically in a correct presentation for migraine.  I do not believe this is a primary headache disorder.  I believe that he is having secondary headaches as a result of something such as sleep apnea has he does have a sleep disturbance as described below.  I believe that he has having clenching and grinding of his jaw that is causing some TMJ pain that is then referring to headache.  He notes that he has been started on Flexeril recently and over the past couple days he has had less pain although it is still there.    We will proceed with continue with Flexeril.  Have advised he take this nightly to see if we could help reduce the amount of muscle tension in his jaws and overall improve his pain.  Will also attempt Depakote 500 mg daily to see how this helps with the headache.  I have discussed with him to call me with any questions or concerns or worsening symptoms.  Just for thorough evaluation, we will go ahead and check MRI of the brain without contrast as he has had significant worsening over the past half a year.

## 2024-05-17 NOTE — ASSESSMENT & PLAN NOTE
At this time believe that he has symptoms of sleep determinants that are consistent with his obstructive sleep apnea.  He indicates that he has had daytime hypersomnolence in the past but this is not overwhelming at this point.  However, he does have grinding of his teeth, extreme snoring, pauses in his breathing, and multiple awakenings at night that are concerning.  We will test with a home sleep study to determine if he has obstructive sleep apnea.  Discussed this in detail with him and his wife today.

## 2024-05-17 NOTE — PROGRESS NOTES
Neurology Consult Note    Referring Provider:   GREY Hutchinson     Reason for Consultation:    Headache    Subjective   History of Present Illness:  Mariano Al is a 63 y.o. male who presents today for headache.  He is routinely followed by Poornima Encinas APRN for primary care.     He notes that he had a bout of headaches similar to this last summer that eased up.  He notes that for the past 5-6 months they have really recurred and gotten worse.  He notes that he always keeps a headache to some degre but it will worsen intermittently.  He can poorly describe how often he has headache or whenever it is worsened versus improved.  He estimates that maybe 3 to 4 days out of the week he will have a more severe headache.  He indicates that he has aching and throbbing behind the left eye primarily but it worsened to being behind both eyes.  He also indicates that he has pain in his jaws bilaterally.  He has recently seen a dentist who has pulled some teeth but also indicated he had teeth grinding.  He now has a mouthguard.  He denies any associated symptoms such as photophobia, phonophobia, dizziness, blurred vision, or nausea.    He denies history of obstructive sleep apnea.  He has very heavy snoring every night.  He will awaken multiple times per night.  He denies any specific daytime hypersomnolence but has had this, as well as nonrestorative sleep, in the past.  He will have some difficulties with initiation of sleep at times.      Allergies:    Penicillins    Medications:  Current Outpatient Medications   Medication Sig Dispense Refill    amLODIPine-benazepril (LOTREL 5-20) 5-20 MG per capsule Take 1 capsule by mouth Daily.      buPROPion XL (WELLBUTRIN XL) 150 MG 24 hr tablet Take 1 tablet by mouth Every Morning.      busPIRone (BUSPAR) 10 MG tablet Take 1 tablet by mouth 3 (Three) Times a Day.      butalbital-acetaminophen-caffeine (FIORICET, ESGIC) -40 MG per tablet Take 1 tablet by mouth  "Every 6 (Six) Hours As Needed for Headache. 10 tablet 0    clindamycin (CLEOCIN) 150 MG capsule Take 1 capsule by mouth Every 6 (Six) Hours.      cyclobenzaprine (FLEXERIL) 5 MG tablet Take 1 tablet by mouth As Needed for Muscle Spasms.      diazePAM (VALIUM) 2 MG tablet Take 1 tablet by mouth Every 6 (Six) Hours As Needed for Anxiety.      famotidine (PEPCID) 20 MG tablet Take 1 tablet by mouth Daily.      metoprolol succinate XL (TOPROL-XL) 50 MG 24 hr tablet Take 1 tablet by mouth Daily.      multivitamin with minerals (MENS MULTIVITAMIN PO) Take 1 tablet by mouth Daily.      rosuvastatin (CRESTOR) 10 MG tablet Take 1 tablet by mouth Every Night.      sertraline (ZOLOFT) 50 MG tablet Take 1 tablet by mouth Daily.      Testosterone Cypionate (DEPOTESTOTERONE CYPIONATE) 200 MG/ML injection Inject 1 mL into the appropriate muscle as directed by prescriber Every 14 (Fourteen) Days.      vitamin D (ERGOCALCIFEROL) 1.25 MG (24546 UT) capsule capsule Take 1 capsule by mouth 1 (One) Time Per Week.      divalproex (DEPAKOTE ER) 500 MG 24 hr tablet Take 1 tablet by mouth Daily. 30 tablet 5     No current facility-administered medications for this visit.     Current outpatient and discharge medications have been reconciled for the patient.  Reviewed by: GREY Morel    Past Medical History:  History reviewed. No pertinent past medical history.  History reviewed. No pertinent surgical history.  History reviewed. No pertinent family history.     Review of Systems   Neurological:  Positive for headache.         Objective   Vital Signs:  Heart Rate:  [81] 81  BP: (180)/(98) 180/98      05/17/24  0834   Weight: 93.9 kg (207 lb)     172.7 cm (68\")  Body mass index is 31.47 kg/m².    Physical Exam  Vitals reviewed.   Constitutional:       Appearance: Normal appearance.   HENT:      Head: Normocephalic.      Mouth/Throat:      Pharynx: Oropharynx is clear.   Eyes:      General: Lids are normal.      Extraocular Movements: " Extraocular movements intact.      Pupils: Pupils are equal, round, and reactive to light.   Cardiovascular:      Rate and Rhythm: Normal rate and regular rhythm.      Pulses: Normal pulses.   Pulmonary:      Effort: Pulmonary effort is normal.   Musculoskeletal:         General: Normal range of motion.      Cervical back: Normal range of motion and neck supple.   Skin:     General: Skin is warm and dry.      Capillary Refill: Capillary refill takes less than 2 seconds.   Neurological:      Motor: Motor strength is normal.     Coordination: Coordination is intact.      Deep Tendon Reflexes: Reflexes are normal and symmetric.   Psychiatric:         Mood and Affect: Mood normal.         Speech: Speech normal.       Neurological Exam  Mental Status  Awake, alert and oriented to person, place and time. Recent and remote memory are intact. Speech is normal. Language is fluent with no aphasia. Attention and concentration are normal.    Cranial Nerves  CN II: Visual acuity is normal. Visual fields full to confrontation.  CN III, IV, VI: Extraocular movements intact bilaterally. Normal lids and orbits bilaterally. Pupils equal round and reactive to light bilaterally.  CN V: Facial sensation is normal.  CN VII: Full and symmetric facial movement.  CN IX, X: Palate elevates symmetrically. Normal gag reflex.  CN XI: Shoulder shrug strength is normal.  CN XII: Tongue midline without atrophy or fasciculations.    Motor   Strength is 5/5 throughout all four extremities.    Sensory  Sensation is intact to light touch, pinprick, vibration and proprioception in all four extremities.    Reflexes  Deep tendon reflexes are 2+ and symmetric in all four extremities.    Coordination    Finger-to-nose, rapid alternating movements and heel-to-shin normal bilaterally without dysmetria.    Gait  Normal casual, toe, heel and tandem gait.    Results Review:    Lab Results   Component Value Date    GLUCOSE 105 (H) 04/13/2024    BUN 15 04/13/2024  "   CREATININE 0.99 04/13/2024    BCR 15.2 04/13/2024    K 4.1 04/13/2024    CO2 25.0 04/13/2024    CALCIUM 9.6 04/13/2024    ALBUMIN 4.8 04/13/2024    AST 27 04/13/2024    ALT 40 04/13/2024     Lab Results   Component Value Date    WBC 7.56 04/13/2024    HGB 15.8 04/13/2024    HCT 46.9 04/13/2024    MCV 86.2 04/13/2024     04/13/2024     No results found for: \"CHOL\", \"CHLPL\", \"TRIG\", \"HDL\", \"LDL\", \"LDLDIRECT\"  No results found for: \"TSH\"  No results found for: \"HGBA1C\"  No results found for: \"FOLATE\"  No results found for: \"PBNQEEHM19\"    IMAGING SCANNED (04/11/2024)  CT Maxillofacial Without Contrast (04/13/2024 21:36)    Chart Review:  ED with Elliott Brush DO (04/13/2024)      Plan   Diagnoses and all orders for this visit:    1. Chronic intractable headache, unspecified headache type (Primary)  Assessment & Plan:  He has been having the onset of headaches that are being worsening for the past 4 to 6 months.  However, about a year ago he had the onset of this very similar headache but it was much milder in nature.  He primarily complains of pain in his jaws and behind his eyes bilaterally.  There are some days that is worse than others but primarily he notes a dull aching and throbbing pain nearly consistently.  He has no associated symptoms with this.  He has been tried and failed on a couple different medications to include metoprolol, Fioricet, Imitrex, and tramadol.  He is also attempted Tylenol.  He cannot take ibuprofen secondary to his kidney function.    At this point his symptoms not consistent with anything such as migraine as he has no migrainous features along with a headache that is not typically in a correct presentation for migraine.  I do not believe this is a primary headache disorder.  I believe that he is having secondary headaches as a result of something such as sleep apnea has he does have a sleep disturbance as described below.  I believe that he has having clenching and " grinding of his jaw that is causing some TMJ pain that is then referring to headache.  He notes that he has been started on Flexeril recently and over the past couple days he has had less pain although it is still there.    We will proceed with continue with Flexeril.  Have advised he take this nightly to see if we could help reduce the amount of muscle tension in his jaws and overall improve his pain.  Will also attempt Depakote 500 mg daily to see how this helps with the headache.  I have discussed with him to call me with any questions or concerns or worsening symptoms.  Just for thorough evaluation, we will go ahead and check MRI of the brain without contrast as he has had significant worsening over the past half a year.    Orders:  -     divalproex (DEPAKOTE ER) 500 MG 24 hr tablet; Take 1 tablet by mouth Daily.  Dispense: 30 tablet; Refill: 5  -     MRI Brain Without Contrast; Future    2. Sleep disturbance  Assessment & Plan:  At this time believe that he has symptoms of sleep determinants that are consistent with his obstructive sleep apnea.  He indicates that he has had daytime hypersomnolence in the past but this is not overwhelming at this point.  However, he does have grinding of his teeth, extreme snoring, pauses in his breathing, and multiple awakenings at night that are concerning.  We will test with a home sleep study to determine if he has obstructive sleep apnea.  Discussed this in detail with him and his wife today.    Orders:  -     Home Sleep Study; Future    3. Teeth grinding  -     Home Sleep Study; Future      Follow-Up:  Return in about 1 month (around 6/17/2024) for Headache.         GREY Morel  05/17/24  10:24 CDT

## 2024-05-22 ENCOUNTER — OFFICE VISIT (OUTPATIENT)
Dept: INTERNAL MEDICINE | Facility: CLINIC | Age: 64
End: 2024-05-22
Payer: COMMERCIAL

## 2024-05-22 VITALS
DIASTOLIC BLOOD PRESSURE: 80 MMHG | HEART RATE: 85 BPM | OXYGEN SATURATION: 99 % | BODY MASS INDEX: 31.67 KG/M2 | WEIGHT: 209 LBS | SYSTOLIC BLOOD PRESSURE: 124 MMHG | HEIGHT: 68 IN | TEMPERATURE: 97.8 F

## 2024-05-22 DIAGNOSIS — F45.8 TEETH GRINDING: ICD-10-CM

## 2024-05-22 DIAGNOSIS — F43.10 PTSD (POST-TRAUMATIC STRESS DISORDER): ICD-10-CM

## 2024-05-22 DIAGNOSIS — E55.9 VITAMIN D DEFICIENCY: ICD-10-CM

## 2024-05-22 DIAGNOSIS — E78.5 HYPERLIPIDEMIA, UNSPECIFIED HYPERLIPIDEMIA TYPE: ICD-10-CM

## 2024-05-22 DIAGNOSIS — I10 PRIMARY HYPERTENSION: Primary | ICD-10-CM

## 2024-05-22 DIAGNOSIS — R79.89 LOW TESTOSTERONE IN MALE: ICD-10-CM

## 2024-05-22 DIAGNOSIS — K21.9 GASTROESOPHAGEAL REFLUX DISEASE, UNSPECIFIED WHETHER ESOPHAGITIS PRESENT: ICD-10-CM

## 2024-05-22 DIAGNOSIS — N18.9 CHRONIC KIDNEY DISEASE, UNSPECIFIED CKD STAGE: ICD-10-CM

## 2024-05-22 DIAGNOSIS — R51.9 CHRONIC INTRACTABLE HEADACHE, UNSPECIFIED HEADACHE TYPE: ICD-10-CM

## 2024-05-22 DIAGNOSIS — E29.1 HYPOGONADISM IN MALE: ICD-10-CM

## 2024-05-22 DIAGNOSIS — G89.29 CHRONIC INTRACTABLE HEADACHE, UNSPECIFIED HEADACHE TYPE: ICD-10-CM

## 2024-05-22 DIAGNOSIS — Q60.0 CONGENITAL ABSENCE OF ONE KIDNEY: ICD-10-CM

## 2024-05-22 DIAGNOSIS — S06.9X9S TRAUMATIC BRAIN INJURY WITH LOSS OF CONSCIOUSNESS, SEQUELA: ICD-10-CM

## 2024-05-22 RX ORDER — FAMOTIDINE 20 MG/1
20 TABLET, FILM COATED ORAL DAILY
Qty: 90 TABLET | Refills: 2 | Status: SHIPPED | OUTPATIENT
Start: 2024-05-22

## 2024-05-22 RX ORDER — CYCLOBENZAPRINE HCL 5 MG
5 TABLET ORAL 3 TIMES DAILY PRN
Qty: 90 TABLET | Refills: 2 | Status: SHIPPED | OUTPATIENT
Start: 2024-05-22

## 2024-05-22 RX ORDER — BUPROPION HYDROCHLORIDE 150 MG/1
150 TABLET ORAL EVERY MORNING
Qty: 90 TABLET | Refills: 2 | Status: SHIPPED | OUTPATIENT
Start: 2024-05-22

## 2024-05-22 RX ORDER — BUSPIRONE HYDROCHLORIDE 10 MG/1
10 TABLET ORAL 3 TIMES DAILY
Qty: 270 TABLET | Refills: 2 | Status: SHIPPED | OUTPATIENT
Start: 2024-05-22

## 2024-05-22 RX ORDER — AMLODIPINE BESYLATE AND BENAZEPRIL HYDROCHLORIDE 5; 20 MG/1; MG/1
1 CAPSULE ORAL DAILY
Qty: 90 CAPSULE | Refills: 2 | Status: SHIPPED | OUTPATIENT
Start: 2024-05-22

## 2024-05-22 RX ORDER — ROSUVASTATIN CALCIUM 10 MG/1
10 TABLET, COATED ORAL NIGHTLY
Qty: 90 TABLET | Refills: 2 | Status: SHIPPED | OUTPATIENT
Start: 2024-05-22

## 2024-05-22 RX ORDER — TESTOSTERONE CYPIONATE 200 MG/ML
200 INJECTION, SOLUTION INTRAMUSCULAR
Qty: 2 ML | Refills: 5 | Status: SHIPPED | OUTPATIENT
Start: 2024-05-22

## 2024-05-22 RX ORDER — ERGOCALCIFEROL 1.25 MG/1
50000 CAPSULE ORAL WEEKLY
Qty: 12 CAPSULE | Refills: 2 | Status: SHIPPED | OUTPATIENT
Start: 2024-05-22

## 2024-05-24 NOTE — PROGRESS NOTES
"      Chief Complaint  [facility]    Subjective        Mariano Al presents to [facility]IMARY [facility]    HPI    Patient here for the above problems.  See Assessment and Plan for further HPI components.      Review of Systems    Objective   Vital Signs:  /80 (BP Location: Left arm, Patient Position: Sitting, Cuff Size: Adult)   Pulse 85   Temp 97.8 °F (36.6 °C) (Temporal)   Ht 172.7 cm (68\")   Wt 94.8 kg (209 lb)   SpO2 99%   BMI 31.78 kg/m²   Estimated body mass index is 31.78 kg/m² as calculated from the following:    Height as of this encounter: 172.7 cm (68\").    Weight as of this encounter: 94.8 kg (209 lb).      Physical Exam  Vitals and nursing note reviewed.   Constitutional:       Appearance: He is obese. He is not ill-appearing.   Eyes:      General: No scleral icterus.     Conjunctiva/sclera: Conjunctivae normal.   Cardiovascular:      Rate and Rhythm: Normal rate and regular rhythm.   Pulmonary:      Effort: Pulmonary effort is normal. No respiratory distress.   Skin:     General: Skin is warm.      Coloration: Skin is not pale.   Neurological:      General: No focal deficit present.      Mental Status: He is alert and oriented to person, place, and time.   Psychiatric:         Mood and Affect: Mood normal.         Behavior: Behavior normal.                       Assessment and Plan   Diagnoses and all orders for this visit:    1. Primary hypertension (Primary)  -     amLODIPine-benazepril (LOTREL 5-20) 5-20 MG per capsule; Take 1 capsule by mouth Daily.  Dispense: 90 capsule; Refill: 2    2. Chronic intractable headache, unspecified headache type  -     cyclobenzaprine (FLEXERIL) 5 MG tablet; Take 1 tablet by mouth 3 (Three) Times a Day As Needed for Muscle Spasms.  Dispense: 90 tablet; Refill: 2    3. Gastroesophageal reflux disease, unspecified whether esophagitis present  -     famotidine (PEPCID) 20 MG tablet; Take 1 tablet by mouth Daily.  Dispense: 90 tablet; " Refill: 2    4. PTSD (post-traumatic stress disorder)  -     sertraline (ZOLOFT) 50 MG tablet; Take 1 tablet by mouth Daily.  Dispense: 90 tablet; Refill: 2  -     busPIRone (BUSPAR) 10 MG tablet; Take 1 tablet by mouth 3 (Three) Times a Day.  Dispense: 270 tablet; Refill: 2  -     buPROPion XL (WELLBUTRIN XL) 150 MG 24 hr tablet; Take 1 tablet by mouth Every Morning.  Dispense: 90 tablet; Refill: 2    5. Vitamin D deficiency  -     vitamin D (ERGOCALCIFEROL) 1.25 MG (63918 UT) capsule capsule; Take 1 capsule by mouth 1 (One) Time Per Week.  Dispense: 12 capsule; Refill: 2    6. Hyperlipidemia, unspecified hyperlipidemia type  -     rosuvastatin (CRESTOR) 10 MG tablet; Take 1 tablet by mouth Every Night.  Dispense: 90 tablet; Refill: 2    7. Low testosterone in male  -     Testosterone Cypionate (DEPOTESTOTERONE CYPIONATE) 200 MG/ML injection; Inject 1 mL into the appropriate muscle as directed by prescriber Every 14 (Fourteen) Days.  Dispense: 2 mL; Refill: 5    8. Teeth grinding    9. Traumatic brain injury with loss of consciousness, sequela    10. Congenital absence of one kidney    11. Chronic kidney disease, unspecified CKD stage    12. Hypogonadism in male      Patient presents today to establish care.  Patient previously followed with Dr. Poornima Encinas.  Patient also follows with Janki Jose for psychiatric needs.    Patient has a history of PTSD.  Patient is on Wellbutrin, buspirone, and Zoloft.  Patient indicates medication regimen works well for him.  Patient has a lot less issues than he did previously.    Patient has a history of significant trauma in the past.  Patient did have a closed head injury.  Likely has traumatic brain injury.  For the last almost 1 month, the patient has had significant headaches.  Nothing is seem to help.  I have talked to his daughter about this who is a nurse that I worked with at the hospital for 5 years.  We were able to get him in with neurology prior to his visit  with me.  Neurology felt as though he was clenching his jaw and grinding his teeth, and there was also concern for sleep disturbance and sleep apnea.  Sleep study is pending.  MRI is also pending.  CT scan was reviewed.  Patient was started on Depakote.  Headache has essentially resolved.    Patient's other past medical history includes hypertension, patient is on amlodipine-benazepril combination.  Patient's blood pressure is well-controlled.  No issues noted.    Patient also has a history of vitamin D deficiency.  We will check this in the future.  Patient is on prescription vitamin D replacement.  We will refill this.    Patient has a history of hypergonadism and low testosterone.  The patient reports no issues with erectile dysfunction.  Energy level is good at the present time.  Recommend that we check his testosterone on day 8 between injections.  Even if the patient's numbers a little bit low, do not recommend increasing or changing dose if symptoms are well-controlled.    Patient has a history of hyperlipidemia.  Patient is on Crestor.  Patient's cholesterol is well-controlled with this as far as he is aware.  Patient tolerates medication without any significant issue.    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  Patient has upcoming follow-up with rheumatology.    Patient essentially has 1 functional kidney.  Patient follows with nephrology.  Patient has some baseline chronic kidney disease.  Need to avoid NSAIDs.    Result Review :  The following data was reviewed by: Juan Guadarrama MD on 05/22/2024: Office notes and CT scan below reviewed.  CT Maxillofacial Without Contrast (04/13/2024 21:36)  IMAGING SCANNED (04/11/2024) - Outside CT scan head  CMP          4/13/2024    22:05   CMP   Glucose 105    BUN 15    Creatinine 0.99    EGFR 86.7    Sodium 140    Potassium 4.1    Chloride 103    Calcium 9.6    Total Protein 7.3    Albumin 4.8    Globulin 2.5    Total Bilirubin 0.9     Alkaline Phosphatase 73    AST (SGOT) 27    ALT (SGPT) 40    Albumin/Globulin Ratio 1.9    BUN/Creatinine Ratio 15.2    Anion Gap 12.0      CBC          4/13/2024    22:05   CBC   WBC 7.56    RBC 5.44    Hemoglobin 15.8    Hematocrit 46.9    MCV 86.2    MCH 29.0    MCHC 33.7    RDW 13.4    Platelets 170      CBC w/diff          4/13/2024    22:05   CBC w/Diff   WBC 7.56    RBC 5.44    Hemoglobin 15.8    Hematocrit 46.9    MCV 86.2    MCH 29.0    MCHC 33.7    RDW 13.4    Platelets 170    Neutrophil Rel % 59.4    Immature Granulocyte Rel % 0.3    Lymphocyte Rel % 32.5    Monocyte Rel % 5.7    Eosinophil Rel % 1.6    Basophil Rel % 0.5          BMP          4/13/2024    22:05   BMP   BUN 15    Creatinine 0.99    Sodium 140    Potassium 4.1    Chloride 103    CO2 25.0    Calcium 9.6            C Reactive Protein          4/13/2024    22:05   Common Labs   C-Reactive Protein <0.30             Called and received some records from nephrology's office.  These were dated 2/15/2024: Patient had lupus anticoagulant that was negative.  Rheumatoid factor was negative.  Anti-CCP was negative.  Sedimentation rate and CRP were noted to be normal.                                                                                Follow Up   Return in about 3 months (around 8/22/2024), or if symptoms worsen or fail to improve, for longitudinal care, follow up for above problems.  Patient was given instructions and counseling regarding his condition or for health maintenance advice. Please see specific information pulled into the AVS if appropriate.       RACIEL Guadarrama MD, FACP, FHM      Electronically signed by Juan Guadarrama MD, 05/24/24, 4:28 PM CDT.

## 2024-05-25 PROBLEM — M19.049 HAND ARTHRITIS: Status: ACTIVE | Noted: 2024-05-25

## 2024-05-25 PROBLEM — F43.10 PTSD (POST-TRAUMATIC STRESS DISORDER): Status: ACTIVE | Noted: 2024-05-25

## 2024-05-25 PROBLEM — Q60.0 CONGENITAL ABSENCE OF ONE KIDNEY: Status: ACTIVE | Noted: 2024-05-25

## 2024-05-28 ENCOUNTER — PATIENT ROUNDING (BHMG ONLY) (OUTPATIENT)
Dept: INTERNAL MEDICINE | Facility: CLINIC | Age: 64
End: 2024-05-28
Payer: COMMERCIAL

## 2024-05-28 NOTE — PROGRESS NOTES
A [employer] message has been sent to the patient for patient [place] [facility] [place]

## 2024-06-10 ENCOUNTER — DOCUMENTATION (OUTPATIENT)
Dept: INTERNAL MEDICINE | Facility: CLINIC | Age: 64
End: 2024-06-10
Payer: COMMERCIAL

## 2024-06-17 ENCOUNTER — TRANSCRIBE ORDERS (OUTPATIENT)
Dept: LAB | Facility: HOSPITAL | Age: 64
End: 2024-06-17
Payer: COMMERCIAL

## 2024-06-17 ENCOUNTER — HOSPITAL ENCOUNTER (OUTPATIENT)
Dept: GENERAL RADIOLOGY | Facility: HOSPITAL | Age: 64
Discharge: HOME | End: 2024-06-17
Payer: COMMERCIAL

## 2024-06-17 ENCOUNTER — HOSPITAL ENCOUNTER (OUTPATIENT)
Dept: MRI IMAGING | Facility: HOSPITAL | Age: 64
Discharge: HOME | End: 2024-06-17
Payer: COMMERCIAL

## 2024-06-17 ENCOUNTER — TRANSCRIBE ORDERS (OUTPATIENT)
Dept: GENERAL RADIOLOGY | Facility: HOSPITAL | Age: 64
End: 2024-06-17
Payer: COMMERCIAL

## 2024-06-17 DIAGNOSIS — M19.042: ICD-10-CM

## 2024-06-17 DIAGNOSIS — G89.29 CHRONIC INTRACTABLE HEADACHE, UNSPECIFIED HEADACHE TYPE: ICD-10-CM

## 2024-06-17 DIAGNOSIS — R51.9 CHRONIC INTRACTABLE HEADACHE, UNSPECIFIED HEADACHE TYPE: ICD-10-CM

## 2024-06-17 DIAGNOSIS — M19.042: Primary | ICD-10-CM

## 2024-06-17 PROCEDURE — 73120 X-RAY EXAM OF HAND: CPT

## 2024-06-17 PROCEDURE — 70551 MRI BRAIN STEM W/O DYE: CPT

## 2024-06-17 PROCEDURE — 73030 X-RAY EXAM OF SHOULDER: CPT

## 2024-06-24 ENCOUNTER — HOSPITAL ENCOUNTER (OUTPATIENT)
Dept: SLEEP CENTER | Age: 64
Discharge: HOME | End: 2024-06-26
Payer: COMMERCIAL

## 2024-06-24 PROCEDURE — G0399 HOME SLEEP TEST/TYPE 3 PORTA: HCPCS

## 2024-06-24 NOTE — PROGRESS NOTES
Mr. Hugo Gama presented today in the sleep center for a Home Sleep Test (HST).  Ms Villanueva was instructed on the device and was requested to wear the unit for two nights.   was asked to have the HST monitor back by 4PM on  06/26/2024. The patient acknowledged he/she understood. The HST device was tested and was in working order.

## 2024-06-25 PROCEDURE — G0399 HOME SLEEP TEST/TYPE 3 PORTA: HCPCS

## 2024-06-28 ENCOUNTER — TELEPHONE (OUTPATIENT)
Dept: NEUROLOGY | Facility: CLINIC | Age: 64
End: 2024-06-28
Payer: COMMERCIAL

## 2024-06-28 NOTE — TELEPHONE ENCOUNTER
----- Message from Kevin Wood sent at 6/19/2024  7:40 PM CDT -----  No acute findings on MRI. There are areas or signal changes that would be consistent with his previous TBI. Please let him know.  We will discuss at his next visit.

## 2024-06-28 NOTE — TELEPHONE ENCOUNTER
CALLED PATIENT AND SPOKE WITH WIFE. GAVE HER THE MRI RESULTS AND TOLD HER THAT DAVID CAN TALK FURTHER WITH THEM AT HIS VISIT ON TUESDAY NEXT WEEK.

## 2024-07-02 ENCOUNTER — OFFICE VISIT (OUTPATIENT)
Dept: NEUROLOGY | Facility: CLINIC | Age: 64
End: 2024-07-02
Payer: COMMERCIAL

## 2024-07-02 VITALS
SYSTOLIC BLOOD PRESSURE: 158 MMHG | OXYGEN SATURATION: 96 % | HEART RATE: 87 BPM | DIASTOLIC BLOOD PRESSURE: 98 MMHG | HEIGHT: 68 IN | WEIGHT: 208 LBS | BODY MASS INDEX: 31.52 KG/M2

## 2024-07-02 DIAGNOSIS — G89.29 CHRONIC INTRACTABLE HEADACHE, UNSPECIFIED HEADACHE TYPE: Primary | ICD-10-CM

## 2024-07-02 DIAGNOSIS — R51.9 CHRONIC INTRACTABLE HEADACHE, UNSPECIFIED HEADACHE TYPE: Primary | ICD-10-CM

## 2024-07-02 PROCEDURE — 99213 OFFICE O/P EST LOW 20 MIN: CPT | Performed by: NURSE PRACTITIONER

## 2024-07-02 NOTE — ASSESSMENT & PLAN NOTE
He is doing very well without any new headache since starting Depakote.  He denies any side effects.  He has had sleep study performed.  We will continue his Depakote and await the sleep study.  We can consider weaning Depakote in the near future depending on Sleep Study results.  I continue to not think this is a primary headache disorder such as Migraine.

## 2024-07-02 NOTE — PROGRESS NOTES
Neurology Progress Note    Chief Complaint:    Headache    Subjective   History of Present Illness:  Mariano Al is a 63 y.o. male who presents today for headache.  He is routinely followed by Juan Guadarrama MD for primary care.     Since last being seen he has started Depakote and has done very well.  He hasn't had any further headaches.  He had sleep study performed and has yet to receive the results. He continues with DEBBIE symptoms. MRI is without any new findings and has hyperintense areas in bilateral temporal lobes consistent with his previous history of TBI.  He has seen Dr. Guadarrama as well.    Allergies:    Penicillins    Medications:  Current Outpatient Medications   Medication Sig Dispense Refill    amLODIPine-benazepril (LOTREL 5-20) 5-20 MG per capsule Take 1 capsule by mouth Daily. 90 capsule 2    buPROPion XL (WELLBUTRIN XL) 150 MG 24 hr tablet Take 1 tablet by mouth Every Morning. 90 tablet 2    busPIRone (BUSPAR) 10 MG tablet Take 1 tablet by mouth 3 (Three) Times a Day. 270 tablet 2    clindamycin (CLEOCIN) 150 MG capsule Take 1 capsule by mouth Every 6 (Six) Hours.      divalproex (DEPAKOTE ER) 500 MG 24 hr tablet Take 1 tablet by mouth Daily. 30 tablet 5    famotidine (PEPCID) 20 MG tablet Take 1 tablet by mouth Daily. 90 tablet 2    multivitamin with minerals (MENS MULTIVITAMIN PO) Take 1 tablet by mouth Daily.      rosuvastatin (CRESTOR) 10 MG tablet Take 1 tablet by mouth Every Night. 90 tablet 2    sertraline (ZOLOFT) 50 MG tablet Take 1 tablet by mouth Daily. 90 tablet 2    Testosterone Cypionate (DEPOTESTOTERONE CYPIONATE) 200 MG/ML injection Inject 1 mL into the appropriate muscle as directed by prescriber Every 14 (Fourteen) Days. 2 mL 5    vitamin D (ERGOCALCIFEROL) 1.25 MG (54308 UT) capsule capsule Take 1 capsule by mouth 1 (One) Time Per Week. 12 capsule 2     No current facility-administered medications for this visit.     Current outpatient and discharge medications have  "been reconciled for the patient.  Reviewed by: GREY Morel    Past Medical History:  Past Medical History:   Diagnosis Date    Anxiety     Chronic kidney disease     Depression     Headache     Hyperlipidemia     Hypertension     Hypogonadism in male     Mood disorder     Osteoarthritis     PTSD (post-traumatic stress disorder)     Sinusitis     Sleep disturbance     Traumatic brain injury      History reviewed. No pertinent surgical history.  Family History   Problem Relation Age of Onset    Heart disease Father     Nephrolithiasis Father      Social History     Tobacco Use    Smoking status: Never    Smokeless tobacco: Never   Vaping Use    Vaping status: Never Used   Substance Use Topics    Alcohol use: Not Currently     Alcohol/week: 5.0 standard drinks of alcohol     Types: 5 Shots of liquor per week    Drug use: Never     Review of Systems   Neurological:  Positive for headache.         Objective   Vital Signs:  Heart Rate:  [87] 87  BP: (158)/(98) 158/98      07/02/24  1512   Weight: 94.3 kg (208 lb)     172.7 cm (68\")  Body mass index is 31.63 kg/m².    Physical Exam  Vitals reviewed.   Constitutional:       Appearance: Normal appearance.   HENT:      Head: Normocephalic.      Mouth/Throat:      Pharynx: Oropharynx is clear.   Eyes:      General: Lids are normal.      Extraocular Movements: Extraocular movements intact.      Pupils: Pupils are equal, round, and reactive to light.   Cardiovascular:      Rate and Rhythm: Normal rate and regular rhythm.      Pulses: Normal pulses.   Pulmonary:      Effort: Pulmonary effort is normal.   Musculoskeletal:         General: Normal range of motion.      Cervical back: Normal range of motion and neck supple.   Skin:     General: Skin is warm and dry.      Capillary Refill: Capillary refill takes less than 2 seconds.   Neurological:      Motor: Motor strength is normal.     Coordination: Coordination is intact.      Deep Tendon Reflexes: Reflexes are normal " "and symmetric.   Psychiatric:         Mood and Affect: Mood normal.         Speech: Speech normal.       Neurological Exam  Mental Status  Awake, alert and oriented to person, place and time. Recent and remote memory are intact. Speech is normal. Language is fluent with no aphasia. Attention and concentration are normal.    Cranial Nerves  CN II: Visual acuity is normal. Visual fields full to confrontation.  CN III, IV, VI: Extraocular movements intact bilaterally. Normal lids and orbits bilaterally. Pupils equal round and reactive to light bilaterally.  CN V: Facial sensation is normal.  CN VII: Full and symmetric facial movement.  CN IX, X: Palate elevates symmetrically. Normal gag reflex.  CN XI: Shoulder shrug strength is normal.  CN XII: Tongue midline without atrophy or fasciculations.    Motor   Strength is 5/5 throughout all four extremities.    Sensory  Sensation is intact to light touch, pinprick, vibration and proprioception in all four extremities.    Reflexes  Deep tendon reflexes are 2+ and symmetric in all four extremities.    Coordination    Finger-to-nose, rapid alternating movements and heel-to-shin normal bilaterally without dysmetria.    Gait  Normal casual, toe, heel and tandem gait.    Results Review:    Lab Results   Component Value Date    GLUCOSE 105 (H) 04/13/2024    BUN 15 04/13/2024    CREATININE 0.99 04/13/2024    BCR 15.2 04/13/2024    K 4.1 04/13/2024    CO2 25.0 04/13/2024    CALCIUM 9.6 04/13/2024    ALBUMIN 4.8 04/13/2024    AST 27 04/13/2024    ALT 40 04/13/2024     Lab Results   Component Value Date    WBC 7.56 04/13/2024    HGB 15.8 04/13/2024    HCT 46.9 04/13/2024    MCV 86.2 04/13/2024     04/13/2024     No results found for: \"CHOL\", \"CHLPL\", \"TRIG\", \"HDL\", \"LDL\", \"LDLDIRECT\"  No results found for: \"TSH\"  No results found for: \"HGBA1C\"  No results found for: \"FOLATE\"  No results found for: \"KVPSROXR72\"    IMAGING SCANNED (04/11/2024)  CT Maxillofacial Without Contrast " (04/13/2024 21:36)    Chart Review:  ED with Elliott Brush DO (04/13/2024)      Plan   Diagnoses and all orders for this visit:    1. Chronic intractable headache, unspecified headache type (Primary)  Assessment & Plan:  He is doing very well without any new headache since starting Depakote.  He denies any side effects.  He has had sleep study performed.  We will continue his Depakote and await the sleep study.  We can consider weaning Depakote in the near future depending on Sleep Study results.  I continue to not think this is a primary headache disorder such as Migraine.      Follow-Up:  Return in about 6 weeks (around 8/13/2024) for Headache.         GREY Morel  07/02/24  15:37 CDT

## 2024-07-04 DIAGNOSIS — G47.33 OSA (OBSTRUCTIVE SLEEP APNEA): Primary | ICD-10-CM

## 2024-07-05 ENCOUNTER — TELEPHONE (OUTPATIENT)
Dept: SLEEP CENTER | Age: 64
End: 2024-07-05

## 2024-07-05 NOTE — TELEPHONE ENCOUNTER
Spoke to Ms.Charolette Phipps and went over the results of Hugo Villanueva's HST performed 06/24/2024 and 06/25/2024.  Questions answered.  Orders, documentation and insurance information were sent to [facility].

## 2024-07-08 DIAGNOSIS — F45.8 TEETH GRINDING: ICD-10-CM

## 2024-07-08 DIAGNOSIS — G47.9 SLEEP DISTURBANCE: ICD-10-CM

## 2024-07-17 DIAGNOSIS — G47.33 OSA (OBSTRUCTIVE SLEEP APNEA): Primary | ICD-10-CM

## 2024-08-22 ENCOUNTER — TELEPHONE (OUTPATIENT)
Dept: NEUROLOGY | Facility: CLINIC | Age: 64
End: 2024-08-22
Payer: COMMERCIAL

## 2024-08-22 ENCOUNTER — OFFICE VISIT (OUTPATIENT)
Dept: INTERNAL MEDICINE | Facility: CLINIC | Age: 64
End: 2024-08-22
Payer: COMMERCIAL

## 2024-08-22 VITALS
TEMPERATURE: 97.8 F | WEIGHT: 207 LBS | HEART RATE: 75 BPM | SYSTOLIC BLOOD PRESSURE: 114 MMHG | DIASTOLIC BLOOD PRESSURE: 70 MMHG | OXYGEN SATURATION: 98 % | BODY MASS INDEX: 31.37 KG/M2 | HEIGHT: 68 IN

## 2024-08-22 DIAGNOSIS — Q60.0 CONGENITAL ABSENCE OF ONE KIDNEY: ICD-10-CM

## 2024-08-22 DIAGNOSIS — R51.9 CHRONIC INTRACTABLE HEADACHE, UNSPECIFIED HEADACHE TYPE: ICD-10-CM

## 2024-08-22 DIAGNOSIS — E78.5 HYPERLIPIDEMIA, UNSPECIFIED HYPERLIPIDEMIA TYPE: ICD-10-CM

## 2024-08-22 DIAGNOSIS — F39 MOOD DISORDER: ICD-10-CM

## 2024-08-22 DIAGNOSIS — H61.22 IMPACTED CERUMEN OF LEFT EAR: ICD-10-CM

## 2024-08-22 DIAGNOSIS — E29.1 HYPOGONADISM IN MALE: ICD-10-CM

## 2024-08-22 DIAGNOSIS — N18.31 CKD STAGE 3A, GFR 45-59 ML/MIN: Primary | ICD-10-CM

## 2024-08-22 DIAGNOSIS — G89.29 CHRONIC INTRACTABLE HEADACHE, UNSPECIFIED HEADACHE TYPE: ICD-10-CM

## 2024-08-22 DIAGNOSIS — I10 PRIMARY HYPERTENSION: ICD-10-CM

## 2024-08-22 DIAGNOSIS — G47.33 OSA (OBSTRUCTIVE SLEEP APNEA): ICD-10-CM

## 2024-08-22 PROBLEM — N18.9 CHRONIC KIDNEY DISEASE: Status: RESOLVED | Noted: 2024-05-17 | Resolved: 2024-08-22

## 2024-08-22 NOTE — PROGRESS NOTES
"      Chief Complaint  Hypertension (3 month follow up - went to kidney DR and they want to change BP medication or stronger medication ) and Ear Fullness (Right ear )    Subjective        Mariano Al presents to [facility]    HPI    Patient here for the above problems.  See Assessment and Plan for further HPI components.      Review of Systems    Objective   Vital Signs:  /70 (BP Location: Left arm, Patient Position: Sitting, Cuff Size: Adult)   Pulse 75   Temp 97.8 °F (36.6 °C) (Temporal)   Ht 172.7 cm (68\")   Wt 93.9 kg (207 lb)   SpO2 98%   BMI 31.47 kg/m²   Estimated body mass index is 31.47 kg/m² as calculated from the following:    Height as of this encounter: 172.7 cm (68\").    Weight as of this encounter: 93.9 kg (207 lb).      Physical Exam  Vitals and nursing note reviewed.   Constitutional:       Appearance: He is not ill-appearing.   HENT:      Left Ear: There is impacted cerumen.      Ears:      Comments: Scarring right TM  Left ear canal impacted   Eyes:      General: No scleral icterus.     Conjunctiva/sclera: Conjunctivae normal.   Pulmonary:      Effort: Pulmonary effort is normal. No respiratory distress.   Neurological:      General: No focal deficit present.      Mental Status: He is alert and oriented to person, place, and time.   Psychiatric:         Mood and Affect: Mood normal.         Behavior: Behavior normal.                         Assessment and Plan   Diagnoses and all orders for this visit:    1. CKD stage 3a, GFR 45-59 ml/min (Primary)    2. Chronic intractable headache, unspecified headache type    3. DEBBIE (obstructive sleep apnea)    4. Mood disorder    5. Congenital absence of one kidney    6. Hypogonadism in male    7. Primary hypertension    8. Hyperlipidemia, unspecified hyperlipidemia type      Patient presents today for follow-up.  Patient has a history of hypertension.  Patient's blood pressures been well-controlled.  The patient " is recommended to perform ambulatory blood pressure monitoring.  The patient's blood pressureIs controlled with amlodipine and benazepril.  The patient follows with nephrology for his chronic kidney disease.  However he indicates that he would prefer to not go see nephrology anymore as he does not feel like they are doing a lot at the present time.  He would rather me just monitor his kidneys.  I am completely okay with this.  Patient has congenital 1 kidney.  Patient has had borderline chronic kidney disease stage II to stage IIIa.  Most recently he said his GFR was in the 50s.  That would put him into CKD stage IIIa.  Patient needs to avoid NSAIDs.  Patient works outside and needs to focus on making sure to stay hydrated.  We will keep an eye on his kidney function with routine metabolic panel.    Patient has a history of mood disorder including anxiety and depression.  Patient also has some PTSD and history of traumatic brain injury.  Patient follows with psychiatry.  The patient is noted to be on Zoloft, BuSpar, and Wellbutrin.  Patient is doing quite well on these medications.    Patient has hypogonadism.  Patient recently had testosterone checked at the midway point between injections.  The patient was noted to have a total testosterone of over 400 and a free testosterone of greater than 15.  These are adequate for the patient's age.  Patient feels as though he is doing well and has no significant complaints at the present time in regards to his testosterone therapy.    Patient was diagnosed with obstructive sleep apnea.  He only had 2 events per his report.  He is not going to do a CPAP for these minimal events.    The patient was having chronic intractable headaches.  Patient was started on Depakote.  Patient has had no further headaches.  Patient reports that he was also having issues with 3 teeth and has since had these extracted.  Patient may have been having this headache related to the issues with the  teeth and not necessarily related to the sleep apnea.  Patient may be able to work with neurology on weaning off the Depakote.    Patient has history of hyperlipidemia.  Patient is on rosuvastatin 10 mg, recommend we continue his medication at the present time.  There was some very limited literature on rosuvastatin and proteinuria in people with chronic kidney disease.  This is being further studied.  We will keep on the rosuvastatin at the present time.  May consider switching over to atorvastatin.    Patient has some fullness in the right ear.  Patient has a little bit of fluid behind the ear does not appear infected.  Recommend the patient use Flonase.  Also takes Mucinex.    Left ear is impacted.  Will get this cleaned out.    Request records from nephrology    Left ear impacted  Scar on right ear      Result Review :           BMI is >= 30 and <35. (Class 1 Obesity). The following options were offered after discussion;: exercise counseling/recommendations and nutrition counseling/recommendations      BMI is >= 30 and <35. (Class 1 Obesity). The following options were offered after discussion;: exercise counseling/recommendations and nutrition counseling/recommendations            Follow Up   Return in about 6 months (around 2/22/2025), or if symptoms worsen or fail to improve, for follow up for above problems. Longitudinal care., Next scheduled follow up .  Patient was given instructions and counseling regarding his condition or for health maintenance advice. Please see specific information pulled into the AVS if appropriate.       RACIEL Guadarrama MD, [facility], [facility]      Electronically signed by Juan Guadarrama MD, 08/22/24, 4:49 PM CDT.              Ear Cerumen Removal    Date/Time: 8/22/2024 5:05 PM    Performed by: Juan Guadarrama MD  Authorized by: Juan Guadarrama MD  Location details: left ear  Patient tolerance: patient tolerated the procedure well with no immediate complications  Comments:  Improved after procedure    Procedure type: instrumentation, irrigation, curette   Sedation:  Patient sedated: no

## 2024-08-22 NOTE — TELEPHONE ENCOUNTER
CALLED PATIENT TO LET THEM KNOW I WOULD BE CANCELING THEIR APPOINTEMTN BEING DAVID IS LEAVING, I LET THEM KNOW AT THIS TIME THAT WE DO NOT HAVE ANOTHER PHYSICIAN TAKING OVER PREM PATIENTS. I LET THEM KNOW IF THEY HAVE ANY CONCERNS THEY ARE MORE THAN WELCOME TO CALL OUR OFFICE BUT IF THEY FEEL THEY NEED TO BE SEEN THEY WILL MORE THAN LIKELY NEED TO CONTACT PCP TO BE SEEN OR REFERRED ELSEWHERE.  THEY VOICED UNDERSTANDING.

## 2024-09-11 ENCOUNTER — OFFICE VISIT (OUTPATIENT)
Dept: INTERNAL MEDICINE | Facility: CLINIC | Age: 64
End: 2024-09-11
Payer: COMMERCIAL

## 2024-09-11 VITALS
WEIGHT: 204 LBS | OXYGEN SATURATION: 97 % | TEMPERATURE: 97.8 F | HEIGHT: 68 IN | BODY MASS INDEX: 30.92 KG/M2 | SYSTOLIC BLOOD PRESSURE: 134 MMHG | HEART RATE: 88 BPM | DIASTOLIC BLOOD PRESSURE: 76 MMHG

## 2024-09-11 DIAGNOSIS — H16.001 CORNEAL ULCERATION, RIGHT: ICD-10-CM

## 2024-09-11 DIAGNOSIS — J30.9 ALLERGIC SINUSITIS: Primary | ICD-10-CM

## 2024-09-11 DIAGNOSIS — H10.11 ALLERGIC CONJUNCTIVITIS OF RIGHT EYE: ICD-10-CM

## 2024-09-11 PROCEDURE — 96372 THER/PROPH/DIAG INJ SC/IM: CPT | Performed by: NURSE PRACTITIONER

## 2024-09-11 PROCEDURE — 99213 OFFICE O/P EST LOW 20 MIN: CPT | Performed by: NURSE PRACTITIONER

## 2024-09-11 RX ORDER — FLUTICASONE PROPIONATE 50 MCG
2 SPRAY, SUSPENSION (ML) NASAL DAILY
COMMUNITY

## 2024-09-11 RX ORDER — PREDNISONE 20 MG/1
20 TABLET ORAL DAILY
Qty: 5 TABLET | Refills: 0 | Status: SHIPPED | OUTPATIENT
Start: 2024-09-11 | End: 2024-09-16

## 2024-09-11 RX ORDER — TRIAMCINOLONE ACETONIDE 40 MG/ML
40 INJECTION, SUSPENSION INTRA-ARTICULAR; INTRAMUSCULAR ONCE
Status: COMPLETED | OUTPATIENT
Start: 2024-09-11 | End: 2024-09-11

## 2024-09-11 RX ORDER — KETOTIFEN FUMARATE 0.35 MG/ML
1 SOLUTION/ DROPS OPHTHALMIC 2 TIMES DAILY
Qty: 1 ML | Refills: 0 | Status: SHIPPED | OUTPATIENT
Start: 2024-09-11 | End: 2024-09-18

## 2024-09-11 RX ORDER — OFLOXACIN 3 MG/ML
1 SOLUTION/ DROPS OPHTHALMIC 4 TIMES DAILY
Qty: 1 ML | Refills: 0 | Status: SHIPPED | OUTPATIENT
Start: 2024-09-11 | End: 2024-09-16

## 2024-09-11 RX ADMIN — TRIAMCINOLONE ACETONIDE 40 MG: 40 INJECTION, SUSPENSION INTRA-ARTICULAR; INTRAMUSCULAR at 11:18

## 2024-09-11 NOTE — PROGRESS NOTES
Subjective     Chief Complaint:  Nasal congestion.  Cough.  Right eye irritation.    HPI:  Patient presents to the office today with complaints of nasal congestion and cough since Saturday.  This morning, he noted his right eye was matted shut and has been watery/irritated.  Please see assessment and plan below.    Patient's PMR from outside medical facility reviewed and noted.    Past Medical History:   Past Medical History:   Diagnosis Date    Anxiety     Chronic kidney disease     Depression     Headache     Hyperlipidemia     Hypertension     Hypogonadism in male     Mood disorder     Osteoarthritis     PTSD (post-traumatic stress disorder)     Sinusitis     Sleep disturbance     Traumatic brain injury      Past Surgical History:No past surgical history on file.  Social History:  reports that he has never smoked. He has never used smokeless tobacco. He reports that he does not currently use alcohol after a past usage of about 5.0 standard drinks of alcohol per week. He reports that he does not use drugs.    Family History: family history includes Heart disease in his father; Nephrolithiasis in his father.      Allergies:  Allergies   Allergen Reactions    Penicillins Rash     Medications:  Prior to Admission medications    Medication Sig Start Date End Date Taking? Authorizing Provider   amLODIPine-benazepril (LOTREL 5-20) 5-20 MG per capsule Take 1 capsule by mouth Daily. 5/22/24  Yes Juan Guadarrama MD   buPROPion XL (WELLBUTRIN XL) 150 MG 24 hr tablet Take 1 tablet by mouth Every Morning. 5/22/24  Yes Juan Guadarrama MD   busPIRone (BUSPAR) 10 MG tablet Take 1 tablet by mouth 3 (Three) Times a Day. 5/22/24  Yes Juan Guadarrama MD   divalproex (DEPAKOTE ER) 500 MG 24 hr tablet Take 1 tablet by mouth Daily. 5/17/24  Yes Kevin Wood APRN   famotidine (PEPCID) 20 MG tablet Take 1 tablet by mouth Daily. 5/22/24  Yes Juan Guadarrama MD   multivitamin with minerals (MENS MULTIVITAMIN  "PO) Take 1 tablet by mouth Daily.   Yes Hamlet Oakley MD   rosuvastatin (CRESTOR) 10 MG tablet Take 1 tablet by mouth Every Night. 5/22/24  Yes Juan Guadarrama MD   sertraline (ZOLOFT) 50 MG tablet Take 1 tablet by mouth Daily. 5/22/24  Yes Juan Guadarrama MD   Testosterone Cypionate (DEPOTESTOTERONE CYPIONATE) 200 MG/ML injection Inject 1 mL into the appropriate muscle as directed by prescriber Every 14 (Fourteen) Days. 5/22/24  Yes Juan Guadarrama MD   vitamin D (ERGOCALCIFEROL) 1.25 MG (32857 UT) capsule capsule Take 1 capsule by mouth 1 (One) Time Per Week. 5/22/24  Yes Juan Guadarrama MD   fluticasone (FLONASE) 50 MCG/ACT nasal spray 2 sprays into the nostril(s) as directed by provider Daily.    Hamlet Oakley MD   Ketotifen Fumarate (ZADITOR) 0.035 % solution Administer 1 drop to the right eye 2 (Two) Times a Day for 7 days. 9/11/24 9/18/24  Raven Ndiaye APRN   ofloxacin (Ocuflox) 0.3 % ophthalmic solution Administer 1 drop to the right eye 4 (Four) Times a Day for 5 days. 9/11/24 9/16/24  Raven Ndiaye APRN   predniSONE (DELTASONE) 20 MG tablet Take 1 tablet by mouth Daily for 5 days. 9/11/24 9/16/24  Raven Ndiaye APRN   clindamycin (CLEOCIN) 150 MG capsule Take 1 capsule by mouth Every 6 (Six) Hours. 5/7/24 9/11/24  ProviderHamlet MD       Objective     Vital Signs: /76 (BP Location: Left arm, Patient Position: Sitting, Cuff Size: Adult)   Pulse 88   Temp 97.8 °F (36.6 °C) (Temporal)   Ht 172.7 cm (67.99\")   Wt 92.5 kg (204 lb)   SpO2 97%   BMI 31.03 kg/m²   Physical Exam  Vitals and nursing note reviewed.   Constitutional:       General: He is not in acute distress.     Appearance: He is obese. He is not ill-appearing or toxic-appearing.   HENT:      Head: Normocephalic and atraumatic.      Right Ear: Tympanic membrane is scarred.      Left Ear: A middle ear effusion is present. Tympanic membrane is not injected, erythematous or bulging. " Tympanic membrane has normal mobility.      Nose: Congestion present.      Right Sinus: Maxillary sinus tenderness present.      Left Sinus: Maxillary sinus tenderness present.      Mouth/Throat:      Mouth: Mucous membranes are moist.      Pharynx: Oropharynx is clear. No pharyngeal swelling, oropharyngeal exudate or posterior oropharyngeal erythema.   Eyes:      Conjunctiva/sclera:      Right eye: Right conjunctiva is injected. Exudate (watery drainage) present.      Pupils:      Right eye: Fluorescein uptake present.      Slit lamp exam:     Right eye: Corneal ulcer present.   Cardiovascular:      Rate and Rhythm: Normal rate and regular rhythm.      Pulses: Normal pulses.      Heart sounds: Normal heart sounds.   Pulmonary:      Effort: Pulmonary effort is normal.      Breath sounds: No wheezing, rhonchi or rales.   Abdominal:      General: Bowel sounds are normal. There is no distension.      Palpations: Abdomen is soft.      Tenderness: There is no abdominal tenderness.   Musculoskeletal:         General: No swelling or tenderness. Normal range of motion.      Cervical back: Normal range of motion and neck supple. No tenderness.   Skin:     General: Skin is warm and dry.      Findings: No erythema or rash.   Neurological:      General: No focal deficit present.      Mental Status: He is alert and oriented to person, place, and time.   Psychiatric:         Mood and Affect: Mood normal.         Behavior: Behavior normal.         Thought Content: Thought content normal.         Judgment: Judgment normal.       Results Reviewed:  No new results to review at this time.    Assessment / Plan     Assessment/Plan:  Diagnoses and all orders for this visit:    1. Allergic sinusitis (Primary)  -     triamcinolone acetonide (KENALOG-40) injection 40 mg  -     predniSONE (DELTASONE) 20 MG tablet; Take 1 tablet by mouth Daily for 5 days.  Dispense: 5 tablet; Refill: 0    2. Allergic conjunctivitis of right eye  -      Ketotifen Fumarate (ZADITOR) 0.035 % solution; Administer 1 drop to the right eye 2 (Two) Times a Day for 7 days.  Dispense: 1 mL; Refill: 0    3. Corneal ulceration, right  -     ofloxacin (Ocuflox) 0.3 % ophthalmic solution; Administer 1 drop to the right eye 4 (Four) Times a Day for 5 days.  Dispense: 1 mL; Refill: 0       Patient presents to the office today with complaints of nasal congestion and cough since Saturday.  He denies any ear pain or pressure.  He denies sore throat.  He has had no fever or chills.  Denies any unusual body aches.  His cough has been nonproductive and he denies any chest congestion.  Denies shortness of breath or wheezing.  This morning, he noted his right eye was matted shut and has been watery/irritated since that time.  He has noted no purulent drainage, just watery drainage.  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  He does wear eye protection when working typically.  He does not necessarily feel like he got anything in his eye and denies foreign body sensation.  He states his eye feels better when it is shut.  Denies vision changes.  Denies any irritation or symptoms of the left eye.  He does not wear contacts.    Patient does take Flonase on a daily basis.  He has been using cough drops as needed since his symptoms started which have been helping.  Does have some maxillary sinus tenderness on exam.  He does have a left middle ear effusion noted as well.  I do feel likely his symptoms are allergy mediated given recent environmental exposures.  He is to continue Flonase and discussed use of an antihistamine daily for at least the next 7 days.  He does have Claritin at home.  He will be given a Kenalog injection for sinus congestion/inflammation followed by a short course of oral prednisone.  He will be treated for allergic conjunctivitis of the right eye with ketotifen drops.  Woods lamp examination performed on the right eye does show a  small area of fluorescein uptake, corneal abrasion versus ulceration.  Ofloxacin drops prescribed for infection prevention.  Patient to call the office for any worsening or no improvement of symptoms.    Return if symptoms worsen or fail to improve, for Next scheduled follow up. unless patient needs to be seen sooner or acute issues arise.    I have discussed the patient results/orders and and plan/recommendation with them at today's visit.      Raven Ndiaye, GREY   09/11/2024

## 2024-10-04 ENCOUNTER — TELEPHONE (OUTPATIENT)
Dept: INTERNAL MEDICINE | Facility: CLINIC | Age: 64
End: 2024-10-04

## 2024-10-04 ENCOUNTER — TELEPHONE (OUTPATIENT)
Dept: INTERNAL MEDICINE | Facility: CLINIC | Age: 64
End: 2024-10-04
Payer: COMMERCIAL

## 2024-10-04 DIAGNOSIS — E78.5 HYPERLIPIDEMIA, UNSPECIFIED HYPERLIPIDEMIA TYPE: ICD-10-CM

## 2024-10-04 RX ORDER — ROSUVASTATIN CALCIUM 10 MG/1
10 TABLET, COATED ORAL NIGHTLY
Qty: 90 TABLET | Refills: 3 | Status: SHIPPED | OUTPATIENT
Start: 2024-10-04

## 2024-10-04 NOTE — TELEPHONE ENCOUNTER
Caller: Whitney Anthony    Relationship: Emergency Contact    Best call back number: [phone]     Requested Prescriptions:   Requested Prescriptions     Pending Prescriptions Disp Refills    rosuvastatin (CRESTOR) 10 MG tablet 90 tablet 2     Sig: Take 1 tablet by mouth Every Night.        Pharmacy where request should be sent: [facility] [address] [place] [address] [phone] [facility] [phone]      Last office visit with prescribing clinician: 8/22/2024   Last telemedicine visit with prescribing clinician: Visit date not found   Next office visit with prescribing clinician: 2/24/2025     Additional details provided by patient:     Does the patient have less than a 3 day supply:  [x] Yes  [] No    Would you like a call back once the refill request has been completed: [] Yes [x] No    If the office needs to give you a call back, can they leave a voicemail: [] Yes [x] No    Kelechi Hickman III, [employer]amra Rep   10/04/24 14:29 CDT

## 2024-10-04 NOTE — TELEPHONE ENCOUNTER
Caller: Whitney Anthony    Relationship: Emergency Contact    Best call back number: [phone]     What is the best time to reach you: ANY    Who are you requesting to speak with (clinical staff, provider,  specific staff member): CLINICAL    Do you know the name of the person who called: WHITNEY    What was the call regarding: WOULD LIKE A CALL BACK TO ASK SOME QUESTIONS ABOUT LAB WORK AND HOW IT'S BILLED/HANDLED IN OFFICE    Is it okay if the provider responds through MyChart: YES OR CALL BACK

## 2024-10-08 ENCOUNTER — TELEPHONE (OUTPATIENT)
Dept: INTERNAL MEDICINE | Facility: CLINIC | Age: 64
End: 2024-10-08

## 2024-10-08 NOTE — TELEPHONE ENCOUNTER
Caller: Whitney Anthony    Relationship to patient: Emergency Contact    Best call back number:     [phone]        Patient is needing: SHE STATES SHE HAS MISSED A CALL ASKING HER TO CALL BACK FOR THE PATIENT ABOUT LABS

## 2024-12-07 DIAGNOSIS — R79.89 LOW TESTOSTERONE IN MALE: ICD-10-CM

## 2024-12-09 RX ORDER — TESTOSTERONE CYPIONATE 200 MG/ML
INJECTION, SOLUTION INTRAMUSCULAR
Qty: 2 ML | Refills: 0 | Status: SHIPPED | OUTPATIENT
Start: 2024-12-09

## 2024-12-09 NOTE — TELEPHONE ENCOUNTER
Ashly's patient transferring to Dr Guadarrama    Last visit 9/11/24  Next visit 2/24/25    Needs UDS and CSA

## 2025-01-25 DIAGNOSIS — R79.89 LOW TESTOSTERONE IN MALE: ICD-10-CM

## 2025-01-27 RX ORDER — TESTOSTERONE CYPIONATE 200 MG/ML
INJECTION, SOLUTION INTRAMUSCULAR
Qty: 2 ML | Refills: 0 | Status: SHIPPED | OUTPATIENT
Start: 2025-01-27

## 2025-02-17 ENCOUNTER — LAB (OUTPATIENT)
Dept: INTERNAL MEDICINE | Facility: CLINIC | Age: 65
End: 2025-02-17
Payer: COMMERCIAL

## 2025-02-17 DIAGNOSIS — I10 PRIMARY HYPERTENSION: ICD-10-CM

## 2025-02-17 DIAGNOSIS — N18.31 CKD STAGE 3A, GFR 45-59 ML/MIN: ICD-10-CM

## 2025-02-17 DIAGNOSIS — E78.5 HYPERLIPIDEMIA, UNSPECIFIED HYPERLIPIDEMIA TYPE: ICD-10-CM

## 2025-02-17 DIAGNOSIS — E29.1 HYPOGONADISM IN MALE: Primary | ICD-10-CM

## 2025-02-17 DIAGNOSIS — Z79.899 ENCOUNTER FOR LONG-TERM (CURRENT) USE OF MEDICATIONS: ICD-10-CM

## 2025-02-17 LAB
AMPHET+METHAMPHET UR QL: NEGATIVE
AMPHETAMINE INTERNAL CONTROL: NORMAL
AMPHETAMINES UR QL: NEGATIVE
BARBITURATE INTERNAL CONTROL: NORMAL
BARBITURATES UR QL SCN: NEGATIVE
BENZODIAZ UR QL SCN: NEGATIVE
BENZODIAZEPINE INTERNAL CONTROL: NORMAL
BUPRENORPHINE INTERNAL CONTROL: NORMAL
BUPRENORPHINE SERPL-MCNC: NEGATIVE NG/ML
CANNABINOIDS SERPL QL: NEGATIVE
COCAINE INTERNAL CONTROL: NORMAL
COCAINE UR QL: NEGATIVE
EXPIRATION DATE: NORMAL
Lab: NORMAL
MDMA (ECSTASY) INTERNAL CONTROL: NORMAL
MDMA UR QL SCN: NEGATIVE
METHADONE INTERNAL CONTROL: NORMAL
METHADONE UR QL SCN: NEGATIVE
METHAMPHETAMINE INTERNAL CONTROL: NORMAL
MORPHINE INTERNAL CONTROL: NORMAL
MORPHINE/OPIATES SCREEN, URINE: NEGATIVE
OXYCODONE INTERNAL CONTROL: NORMAL
OXYCODONE UR QL SCN: NEGATIVE
PCP UR QL SCN: NEGATIVE
PHENCYCLIDINE INTERNAL CONTROL: NORMAL
PROPOXYPH UR QL SCN: NEGATIVE
PROPOXYPHENE INTERNAL CONTROL: NORMAL
THC INTERNAL CONTROL: NORMAL
TRICYCLIC ANTIDEPRESSANTS INTERNAL CONTROL: NORMAL
TRICYCLICS UR QL SCN: NEGATIVE

## 2025-02-21 LAB
ALBUMIN SERPL-MCNC: 4.7 G/DL (ref 3.5–5.2)
ALBUMIN/GLOB SERPL: 2 G/DL
ALP SERPL-CCNC: 70 U/L (ref 39–117)
ALT SERPL-CCNC: 35 U/L (ref 1–41)
AST SERPL-CCNC: 27 U/L (ref 1–40)
BILIRUB SERPL-MCNC: 0.8 MG/DL (ref 0–1.2)
BUN SERPL-MCNC: 12 MG/DL (ref 8–23)
BUN/CREAT SERPL: 10.5 (ref 7–25)
CALCIUM SERPL-MCNC: 10.4 MG/DL (ref 8.6–10.5)
CHLORIDE SERPL-SCNC: 99 MMOL/L (ref 98–107)
CHOLEST SERPL-MCNC: 168 MG/DL (ref 0–200)
CO2 SERPL-SCNC: 23.3 MMOL/L (ref 22–29)
CREAT SERPL-MCNC: 1.14 MG/DL (ref 0.76–1.27)
EGFRCR SERPLBLD CKD-EPI 2021: 72.7 ML/MIN/1.73
GLOBULIN SER CALC-MCNC: 2.3 GM/DL
GLUCOSE SERPL-MCNC: 78 MG/DL (ref 65–99)
HDLC SERPL-MCNC: 44 MG/DL (ref 40–60)
LDLC SERPL CALC-MCNC: 85 MG/DL (ref 0–100)
POTASSIUM SERPL-SCNC: 4.9 MMOL/L (ref 3.5–5.2)
PROT SERPL-MCNC: 7 G/DL (ref 6–8.5)
SODIUM SERPL-SCNC: 138 MMOL/L (ref 136–145)
TESTOST FREE SERPL-MCNC: 16.9 PG/ML (ref 6.6–18.1)
TESTOST SERPL-MCNC: 677 NG/DL (ref 264–916)
TRIGL SERPL-MCNC: 231 MG/DL (ref 0–150)
VLDLC SERPL CALC-MCNC: 39 MG/DL (ref 5–40)

## 2025-02-24 ENCOUNTER — OFFICE VISIT (OUTPATIENT)
Dept: INTERNAL MEDICINE | Facility: CLINIC | Age: 65
End: 2025-02-24
Payer: COMMERCIAL

## 2025-02-24 VITALS
DIASTOLIC BLOOD PRESSURE: 78 MMHG | HEIGHT: 68 IN | TEMPERATURE: 97.8 F | WEIGHT: 210 LBS | SYSTOLIC BLOOD PRESSURE: 110 MMHG | HEART RATE: 83 BPM | OXYGEN SATURATION: 96 % | BODY MASS INDEX: 31.83 KG/M2

## 2025-02-24 DIAGNOSIS — E55.9 VITAMIN D DEFICIENCY: ICD-10-CM

## 2025-02-24 DIAGNOSIS — Z00.00 WELLNESS EXAMINATION: Primary | ICD-10-CM

## 2025-02-24 DIAGNOSIS — M19.041 OSTEOARTHRITIS OF BOTH HANDS, UNSPECIFIED OSTEOARTHRITIS TYPE: ICD-10-CM

## 2025-02-24 DIAGNOSIS — N18.2 CKD (CHRONIC KIDNEY DISEASE) STAGE 2, GFR 60-89 ML/MIN: ICD-10-CM

## 2025-02-24 DIAGNOSIS — E29.1 HYPOGONADISM IN MALE: ICD-10-CM

## 2025-02-24 DIAGNOSIS — E78.5 HYPERLIPIDEMIA, UNSPECIFIED HYPERLIPIDEMIA TYPE: ICD-10-CM

## 2025-02-24 DIAGNOSIS — M19.042 OSTEOARTHRITIS OF BOTH HANDS, UNSPECIFIED OSTEOARTHRITIS TYPE: ICD-10-CM

## 2025-02-24 DIAGNOSIS — I10 PRIMARY HYPERTENSION: ICD-10-CM

## 2025-02-24 DIAGNOSIS — Z12.5 SCREENING PSA (PROSTATE SPECIFIC ANTIGEN): ICD-10-CM

## 2025-02-27 NOTE — PROGRESS NOTES
"      Chief Complaint  Hypertension (6 month follow up )    Subjective        Mariano Al presents to [facility]    HPI    Patient here for the above problems.  See Assessment and Plan for further HPI components.      Review of Systems    Objective   Vital Signs:  /78 (BP Location: Left arm, Patient Position: Sitting, Cuff Size: Adult)   Pulse 83   Temp 97.8 °F (36.6 °C) (Temporal)   Ht 172.7 cm (68\")   Wt 95.3 kg (210 lb)   SpO2 96%   BMI 31.93 kg/m²   Estimated body mass index is 31.93 kg/m² as calculated from the following:    Height as of this encounter: 172.7 cm (68\").    Weight as of this encounter: 95.3 kg (210 lb).      Physical Exam  Vitals and nursing note reviewed.   Constitutional:       Appearance: He is not ill-appearing.   Eyes:      General: No scleral icterus.     Conjunctiva/sclera: Conjunctivae normal.   Pulmonary:      Effort: Pulmonary effort is normal. No respiratory distress.   Neurological:      General: No focal deficit present.      Mental Status: He is alert and oriented to person, place, and time.   Psychiatric:         Mood and Affect: Mood normal.         Behavior: Behavior normal.                       Assessment and Plan   Diagnoses and all orders for this visit:    1. Wellness examination (Primary)  -     Comprehensive Metabolic Panel; Future  -     CBC & Differential; Future  -     Lipid Panel; Future  -     Urinalysis With Microscopic - Urine, Clean Catch; Future  -     TSH Rfx On Abnormal To Free T4; Future    2. Hypogonadism in male  -     Testosterone, Free, Total; Future    3. Hyperlipidemia, unspecified hyperlipidemia type  -     Lipid Panel; Future  -     TSH Rfx On Abnormal To Free T4; Future    4. Primary hypertension  -     Comprehensive Metabolic Panel; Future  -     CBC & Differential; Future  -     Urinalysis With Microscopic - Urine, Clean Catch; Future    5. Vitamin D deficiency  -     Vitamin D,25-Hydroxy; Future    6. " Screening PSA (prostate specific antigen)  -     PSA Screen; Future    7. Osteoarthritis of both hands, unspecified osteoarthritis type  -     Unable to find; Diclofenac 4%, Bupivacaine 2%, Gabapentin 10%, Ibuprofen 3%, Cyclobenzaprine 2%, with DMSO  Apply 1-2 grams (1-2 pumps) to affected area 3-4 times daily  Compounded thru [facility] [place]  Dispense: 90 g; Refill: 2    8. CKD (chronic kidney disease) stage 2, GFR 60-89 ml/min      Today is not the wellness examination.  Next visit will be.  Labs prior to next visit.      History of Present Illness  The patient presents for evaluation of chronic kidney disease, hyperlipidemia, and arthritis.    He reports a cessation of headaches following dental extraction. His anxiety levels have been manageable. He has been on a leave of absence from work, which he has found enjoyable, and is scheduled to return on Monday.    He expresses concern regarding his renal function. He has been consuming Gatorade Zero during dinner, which he finds palatable and believes it may be beneficial due to its lack of sugar content.    He suffers from severe arthritis, which causes him significant discomfort, particularly when lying in bed. He has been using compression gloves for relief. He has previously found relief with prednisone and is considering resuming this medication.    MEDICATIONS  Current: Voltaren gel, prednisone      Assessment & Plan  1. Chronic Kidney Disease Stage 2.  His creatinine levels have shown significant improvement, currently at 1.14, and his GFR has increased to 72. Electrolyte levels are within normal range. He is advised to maintain adequate hydration and avoid excessive intake of substances that could harm the kidneys.    2. Hyperlipidemia.  Cholesterol levels have improved, with LDL cholesterol under 100. Triglyceride levels are slightly elevated at 230 but not concerning. He is encouraged to incorporate fish into his diet and increase physical  activity upon returning to work. He is also advised to continue consuming Gatorade Zero, which will help overall cholesterol and sugar levels.    3. Arthritis.  He reports significant pain in his left shoulder, likely due to arthritis. He is advised to use over-the-counter Voltaren gel for pain management. If ineffective, a prescription for a stronger topical medication containing DMSO will be provided. He is also encouraged to perform stretching exercises for his right arm and use compression gloves at night.  Will send in compounded cream today after further discussion.    Labs as above prior to next visit.  Reviewed below labs with the patient today.    Patient has a BMI > 30.  Obesity increases risks of diseases such as diabetes, coronary artery disease, hypertension, sleep apnea, hyperlipidemia, arthritis, and stroke.  Recommend focusing on portion control and making healthy diet choices.  Avoid fast food.  Avoid calorie beverages including sweet tea, juice, soft drinks, and alcohol.  Recommend increasing your activity and starting a regular exercise program. Can consider utilizing calorie counting apps such as [employer].            Result Review :  The following data was reviewed by: Juan Guadarrama MD on 02/24/2025:  CMP          4/13/2024    22:05 2/17/2025    08:17   CMP   Glucose 105  78    BUN 15  12    Creatinine 0.99  1.14    EGFR 86.7  72.7    Sodium 140  138    Potassium 4.1  4.9    Chloride 103  99    Calcium 9.6  10.4    Total Protein 7.3  7.0    Albumin 4.8  4.7    Globulin 2.5  2.3    Total Bilirubin 0.9  0.8    Alkaline Phosphatase 73  70    AST (SGOT) 27  27    ALT (SGPT) 40  35    Albumin/Globulin Ratio 1.9  2.0    BUN/Creatinine Ratio 15.2  10.5    Anion Gap 12.0       CBC w/diff          4/13/2024    22:05   CBC w/Diff   WBC 7.56    RBC 5.44    Hemoglobin 15.8    Hematocrit 46.9    MCV 86.2    MCH 29.0    MCHC 33.7    RDW 13.4    Platelets 170    Neutrophil Rel % 59.4    Immature  Granulocyte Rel % 0.3    Lymphocyte Rel % 32.5    Monocyte Rel % 5.7    Eosinophil Rel % 1.6    Basophil Rel % 0.5      Lipid Panel          8/19/2024    07:14 2/17/2025    08:17   Lipid Panel   Total Cholesterol 175  168    Triglycerides 131  231    HDL Cholesterol 43  44    VLDL Cholesterol 23  39    LDL Cholesterol  109  85      TSH          8/19/2024    07:14   TSH   TSH 2.130                           BMI is >= 30 and <35. (Class 1 Obesity). The following options were offered after discussion;: exercise counseling/recommendations and nutrition counseling/recommendations            Follow Up   Return in about 6 months (around 8/24/2025) for follow up for above problems. Longitudinal care..  Patient was given instructions and counseling regarding his condition or for health maintenance advice. Please see specific information pulled into the AVS if appropriate.       RACIEL Guadarrama MD, FACP, [facility]      Electronically signed by Juan Guadarrama MD, 02/26/25, 8:24 PM CST.    Patient or patient representative verbalized consent for the use of Ambient Listening during the visit with  Juan Guadarrama MD for chart documentation. 2/26/2025  20:28 CST

## 2025-03-14 DIAGNOSIS — R79.89 LOW TESTOSTERONE IN MALE: ICD-10-CM

## 2025-03-14 RX ORDER — TESTOSTERONE CYPIONATE 200 MG/ML
INJECTION, SOLUTION INTRAMUSCULAR
Qty: 2 ML | Refills: 0 | Status: SHIPPED | OUTPATIENT
Start: 2025-03-14

## 2025-03-22 ENCOUNTER — APPOINTMENT (OUTPATIENT)
Dept: GENERAL RADIOLOGY | Facility: HOSPITAL | Age: 65
End: 2025-03-22
Payer: COMMERCIAL

## 2025-03-22 PROCEDURE — 73560 X-RAY EXAM OF KNEE 1 OR 2: CPT

## 2025-03-24 ENCOUNTER — OFFICE VISIT (OUTPATIENT)
Age: 65
End: 2025-03-24
Payer: COMMERCIAL

## 2025-03-24 VITALS — BODY MASS INDEX: 32.49 KG/M2 | HEIGHT: 67 IN | WEIGHT: 207 LBS

## 2025-03-24 DIAGNOSIS — M70.41 PREPATELLAR BURSITIS OF BOTH KNEES: ICD-10-CM

## 2025-03-24 DIAGNOSIS — S76.112A RUPTURE QUADRICEPS TENDON, LEFT, INITIAL ENCOUNTER: Primary | ICD-10-CM

## 2025-03-24 DIAGNOSIS — M70.42 PREPATELLAR BURSITIS OF BOTH KNEES: ICD-10-CM

## 2025-03-24 DIAGNOSIS — S76.111A RUPTURE OF RIGHT QUADRICEPS TENDON, INITIAL ENCOUNTER: ICD-10-CM

## 2025-03-24 DIAGNOSIS — S89.90XA KNEE INJURY, UNSPECIFIED LATERALITY, INITIAL ENCOUNTER: ICD-10-CM

## 2025-03-24 PROCEDURE — 99204 OFFICE O/P NEW MOD 45 MIN: CPT | Performed by: STUDENT IN AN ORGANIZED HEALTH CARE EDUCATION/TRAINING PROGRAM

## 2025-03-24 NOTE — LETTER
March 24, 2025     Patient: Mariano Al   YOB: 1960   Date of Visit: 3/24/2025       To Whom It May Concern:    It is my medical opinion that Mariano Al should remain out of work until MRI results at next re-evaluation appointment . MRI is currently pending insurance approval to schedule.           Sincerely,        Tc Casas MD    CC: No Recipients

## 2025-03-24 NOTE — PROGRESS NOTES
"  [facility]  Tc Casas MD, PhD  Gerald Casas PA-C    CHIEF COMPLAINT  Bilateral Knee (Patient presents today for bilateral knee pain. X-rays performed at [facility] on 03/22/2025. Patient complains of instability in bilateral knees. Patient fell on 3/22/25. )       HISTORY OF PRESENT ILLNESS    History of Present Illness  The patient is a 64-year-old male who presents as a new patient with bilateral knee pain from an injury and a fall.     He experienced a fall on Saturday, during which he caught his heel getting out of the truck and landed on his knees. After a period of time he was able to slowly get up and start to walk and then his legs gave out on him and he fell again. He felt like he didn't have any \"back pressure\" in his legs. He doesn't have much pain. He has had bruising in the bilateral knees and quads. There was near immediately swelling/enlargement of his distal quads. He had some abrasions on his knees. Currently, he is unable to lift either leg and using a wheelchair to ambulate.     He does see a rheumatologist and is on hydroxychloroquine.  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  He is on testosterone replacement therapy.  He has not been on any antibiotics recently.  He has congenital single kidney and CKD 3 and avoids NSAIDs.           HISTORY    Current Outpatient Medications   Medication Instructions    amLODIPine-benazepril (LOTREL 5-20) 5-20 MG per capsule 1 capsule, Oral, Daily    buPROPion XL (WELLBUTRIN XL) 150 mg, Oral, Every Morning    busPIRone (BUSPAR) 10 mg, Oral, 3 Times Daily    divalproex (DEPAKOTE ER) 500 mg, Oral, Daily    famotidine (PEPCID) 20 mg, Oral, Daily    fluticasone (FLONASE) 50 MCG/ACT nasal spray 2 sprays, As Needed    hydroxychloroquine (PLAQUENIL) 200 MG tablet 1 tablet, Every 12 Hours Scheduled    multivitamin with minerals (MENS MULTIVITAMIN PO) 1 tablet, Daily    " rosuvastatin (CRESTOR) 10 mg, Oral, Nightly    sertraline (ZOLOFT) 50 mg, Oral, Daily    Testosterone Cypionate (DEPOTESTOTERONE CYPIONATE) 200 MG/ML injection INJECT 1 ML INTO THE APPROPRIATE MUSCLE ONCE EVERY 14 DAYS AS DIRECTED BY PRESCRIBER    Unable to find Diclofenac 4%, Bupivacaine 2%, Gabapentin 10%, Ibuprofen 3%, Cyclobenzaprine 2%, with DMSO  Apply 1-2 grams (1-2 pumps) to affected area 3-4 times daily  Compounded thru [facility] [place]    vitamin D (ERGOCALCIFEROL) 50,000 Units, Oral, Weekly         reports that he has never smoked. He has never used smokeless tobacco. He reports current alcohol use of about 5.0 standard drinks of alcohol per week. He reports that he does not use drugs.    Past Medical History:   Diagnosis Date    Anxiety     Chronic kidney disease     Depression     Headache     Hyperlipidemia     Hypertension     Hypogonadism in male     Mood disorder     Osteoarthritis     PTSD (post-traumatic stress disorder)     Sinusitis     Sleep disturbance     Traumatic brain injury         Past Surgical History:   Procedure Laterality Date    HIP PERCUTANEOUS PINNING Left     JOINT REPLACEMENT Left     hip        PHYSICAL EXAM  Constitutional: The patient is in no apparent distress and generally well-appearing. The patient hears me clearly and answers questions appropriately.   Musculoskeletal:  Bilateral knees:  There is scattered bruising throughout the bilateral quadriceps  Superficial skin abrasions on the knees  Visible defect of the distal quadriceps bilaterally  Nontender palpation of the patella, quadriceps tendon, quadriceps, medial and lateral joint lines  Patient does not have intact extensor mechanism of either leg  Able to passively extend both knees but he is unable to keep them extended against gravity  Swelling in the bilateral ankles  No tenderness, swelling, or tightness in the calves or indication for DVT      IMAGING    XR Knee 1 or 2 View Left  Result Date:  3/22/2025  Narrative: EXAMINATION:  XR KNEE 1 OR 2 VW LEFT-  3/22/2025 12:09 PM  HISTORY: The patient fell off of a horse trailer.  COMPARISON: Left knee pain.  TECHNIQUE: 2 views were obtained.  FINDINGS: There is lateral compartment spurring. There is mild to moderate narrowing of the patellofemoral joint. No acute fracture is seen. There is fluid in the suprapatellar bursa region. There is soft tissue swelling anterior to the patella.       Impression: 1. No acute fracture is seen. 2. Degenerative osteoarthritis. 3. Soft tissue swelling anterior to the patella. 4. Small amount of fluid in the suprapatellar joint space.   This report was signed and finalized on 3/22/2025 1:43 PM by Dr. Jackson Macias MD.      XR Knee 1 or 2 View Right  Result Date: 3/22/2025  Narrative: EXAMINATION:  XR KNEE 1 OR 2 VW RIGHT-  3/22/2025 12:09 PM  HISTORY: The patient fell off of a horse trailer. Right knee pain.  COMPARISON: No comparison study.  TECHNIQUE: 2 views were obtained.  FINDINGS: No fracture is seen. There is soft tissue swelling anterior to the patella. There is fluid in the suprapatellar joint space. There is mild to moderate narrowing of the patellofemoral joint space. The medial and lateral compartments are preserved. There are probable soft tissue calcifications posteriorly.       Impression: 1. No fracture is seen. 2. There is fluid in the suprapatellar joint space consistent with effusion. 3. Anterior soft tissue swelling/edema. 4. Degenerative osteoarthritis of the patellofemoral joint space.   This report was signed and finalized on 3/22/2025 1:42 PM by Dr. Jackson Macias MD.         Results  Imaging  X-rays above personally reviewed and interpreted.   No acute osseous abnormalities. Soft tissue swelling.          ASSESSMENT & PLAN  Diagnoses and all orders for this visit:    1. Rupture quadriceps tendon, left, initial encounter (Primary)  -     MRI Knee Left Without Contrast; Future    2. Knee injury,  unspecified laterality, initial encounter    3. Rupture of right quadriceps tendon, initial encounter  -     MRI Knee Right Without Contrast; Future    4. Prepatellar bursitis of both knees    Patient had an acute injury to the bilateral knees when he fell coming out of a truck over the weekend.  He fell down onto his knees and was forced into flexion.  He has bruising and visible defects of the distal quadriceps and does not have an intact extensor mechanism of either knee.  I am concerned for complete ruptures of the bilateral quadriceps tendons.  If this is the case surgical intervention would be recommended.  Patient had a previous hip replacement surgery with Dr. Ruggiero in [place], but he would also be interested in seeing Dr. Blanton.  If it turns out that he does not have a complete rupture we may be able to try treating this nonsurgically.  Either way he will need to be off of work especially given his physically demanding job.  I have ordered MRIs of the bilateral knees to further evaluate.  He likely also has some degree of prepatellar traumatic bursitis.  I have recommended that he be nonweightbearing to keep his legs in extension is much as possible.  He has some simple hinged knee braces on today but I instructed him that he can remove those while he is nonweightbearing and keeping his legs elevated.  He has significant swelling down into his ankles as well which I think is likely dependent swelling from the injury to his knees and lack of activity.  He does not have any signs of DVT based on my exam.  Fortunately, he does not have much pain.    We held off on putting in a different knee brace today in anticipation of possible need for surgical intervention, and the fact that I am keeping him nonweightbearing for now.    I am not sure exactly what his rheumatologic diagnosis is, but an underlying inflammatory arthropathy would put him at risk for a quadriceps rupture.  His testosterone replacement therapy  may also increase this risk.    Assessment & Plan  1. Bilateral knee pain.  The patient presents with bilateral knee pain following a fall on Saturday. He reports that the left knee is worse than the right, with difficulty in straightening the leg and a noticeable lump that has since reduced in size. There is concern for a potential tendon tear, as indicated by the inability to lift the leg and the presence of bruising on both quadriceps. An MRI of the affected knee will be ordered to evaluate the extent of the injury. If the MRI confirms a complete tendon tear, surgical intervention may be necessary. If it is a partial tear, conservative management with time and bracing will be considered.    MRI left and right knees  Nonweightbearing  Elevation, acetaminophen as needed  Work note-off work until cleared  Follow up: Pending above        Patient or patient representative verbalized consent for the use of Ambient Listening during the visit with  Tc Casas MD for chart documentation. 3/24/2025  17:11 CDT    Tc Casas MD, PhD

## 2025-03-26 ENCOUNTER — RESULTS FOLLOW-UP (OUTPATIENT)
Dept: MRI IMAGING | Facility: HOSPITAL | Age: 65
End: 2025-03-26
Payer: COMMERCIAL

## 2025-03-26 ENCOUNTER — HOSPITAL ENCOUNTER (OUTPATIENT)
Dept: MRI IMAGING | Facility: HOSPITAL | Age: 65
Discharge: HOME | End: 2025-03-26
Payer: COMMERCIAL

## 2025-03-26 DIAGNOSIS — S76.111A RUPTURE OF RIGHT QUADRICEPS TENDON, INITIAL ENCOUNTER: Primary | ICD-10-CM

## 2025-03-26 DIAGNOSIS — S76.112A RUPTURE QUADRICEPS TENDON, LEFT, INITIAL ENCOUNTER: ICD-10-CM

## 2025-03-26 DIAGNOSIS — S76.111A RUPTURE OF RIGHT QUADRICEPS TENDON, INITIAL ENCOUNTER: ICD-10-CM

## 2025-03-26 DIAGNOSIS — I10 PRIMARY HYPERTENSION: ICD-10-CM

## 2025-03-26 PROCEDURE — 73721 MRI JNT OF LWR EXTRE W/O DYE: CPT

## 2025-03-26 RX ORDER — AMLODIPINE AND BENAZEPRIL HYDROCHLORIDE 5; 20 MG/1; MG/1
1 CAPSULE ORAL DAILY
Qty: 90 CAPSULE | Refills: 3 | Status: ON HOLD | OUTPATIENT
Start: 2025-03-26

## 2025-03-27 ENCOUNTER — OFFICE VISIT (OUTPATIENT)
Age: 65
End: 2025-03-27
Payer: COMMERCIAL

## 2025-03-27 VITALS — WEIGHT: 205 LBS | HEIGHT: 67 IN | BODY MASS INDEX: 32.18 KG/M2

## 2025-03-27 DIAGNOSIS — S76.111A RUPTURE OF RIGHT QUADRICEPS TENDON, INITIAL ENCOUNTER: ICD-10-CM

## 2025-03-27 DIAGNOSIS — S76.112A RUPTURE OF LEFT QUADRICEPS TENDON, INITIAL ENCOUNTER: ICD-10-CM

## 2025-03-27 PROCEDURE — 99214 OFFICE O/P EST MOD 30 MIN: CPT | Performed by: PHYSICIAN ASSISTANT

## 2025-03-27 RX ORDER — HYDROXYCHLOROQUINE SULFATE 200 MG/1
200 TABLET, FILM COATED ORAL EVERY 12 HOURS SCHEDULED
COMMUNITY
Start: 2025-02-28

## 2025-03-27 ASSESSMENT — ENCOUNTER SYMPTOMS
COLOR CHANGE: 1
BACK PAIN: 0

## 2025-03-27 NOTE — ASSESSMENT & PLAN NOTE
I expressed to the patient that surgical intervention with bilateral quadricep tendon repair was recommended and discussed the risks and benefits of the surgery with him including but not limited to the risk for infection, nerve and artery damage, continued pain, continued decreased range of motion, paresthesias, paralysis, loss of limb, loss of life, need for further surgery.  He conveyed his understanding and willingness to proceed.  We will also set him up with a 2-week clinical recheck following surgery.

## 2025-03-27 NOTE — PROGRESS NOTES
[facility]  [facility]  [address] [place] [address]  Dept: [phone]  Dept Fax: [phone]  Loc: [phone]    Hugo Gama is a 64 y.o. male who presents today for his medical conditions/complaints as noted below.  Hugo Gama is complaining of Leg Pain (Bilateral thigh/DOI: 03/22/2025 - Fell)        HPI:   Patient is a pleasant 64-year-old male presenting to the clinic today as a referral from [facility] orthopedic sports medicine.  He had a fall about 5 days ago directly onto both knees and felt immediate popping sensations in both with pain.  He noted swelling, bruising, inability to fully extend the knees and was seen in office by Dr. Roberts.  MRIs were obtained of both extremities and reviewed by myself and Dr. Fox today in office. MRI of the left knee demonstrates complete disruption of the distal quadricep tendon at the patellar attachment with a moderate amount of retraction.  MRI of the right knee demonstrates complete disruption of the distal quadricep tendon at the patellar attachment with very mild retraction noted.  He is here today for follow-up and has noted quite a bit of discomfort in bilateral knees with inability to extend at the either knee.    Leg Pain   Pertinent negatives include no numbness.       Past Medical History:   Diagnosis Date    Depression     Hypertension        Past Surgical History:   Procedure Laterality Date    JOINT REPLACEMENT Left     Left hip       Family History   Problem Relation Age of Onset    Depression Father        Social History     Tobacco Use    Smoking status: Never    Smokeless tobacco: Current   Substance Use Topics    Alcohol use: Yes     Comment: occasional        Current Outpatient Medications   Medication Sig Dispense Refill    hydroxychloroquine (PLAQUENIL) 200 MG tablet Take 1 tablet by mouth every 12 hours      buPROPion (WELLBUTRIN XL) 150 MG extended release tablet Take 1

## 2025-03-28 ENCOUNTER — PRE-ADMISSION TESTING (OUTPATIENT)
Dept: PREADMISSION TESTING | Facility: HOSPITAL | Age: 65
End: 2025-03-28
Payer: COMMERCIAL

## 2025-03-28 VITALS
DIASTOLIC BLOOD PRESSURE: 75 MMHG | WEIGHT: 205.91 LBS | BODY MASS INDEX: 32.32 KG/M2 | SYSTOLIC BLOOD PRESSURE: 144 MMHG | RESPIRATION RATE: 18 BRPM | HEART RATE: 96 BPM | HEIGHT: 67 IN | OXYGEN SATURATION: 98 %

## 2025-03-28 LAB
ANION GAP SERPL CALCULATED.3IONS-SCNC: 11 MMOL/L (ref 5–15)
BUN SERPL-MCNC: 13 MG/DL (ref 8–23)
BUN/CREAT SERPL: 12.7 (ref 7–25)
CALCIUM SPEC-SCNC: 9.4 MG/DL (ref 8.6–10.5)
CHLORIDE SERPL-SCNC: 99 MMOL/L (ref 98–107)
CO2 SERPL-SCNC: 26 MMOL/L (ref 22–29)
CREAT SERPL-MCNC: 1.02 MG/DL (ref 0.76–1.27)
DEPRECATED RDW RBC AUTO: 41.2 FL (ref 37–54)
EGFRCR SERPLBLD CKD-EPI 2021: 83.1 ML/MIN/1.73
ERYTHROCYTE [DISTWIDTH] IN BLOOD BY AUTOMATED COUNT: 12.7 % (ref 12.3–15.4)
GLUCOSE SERPL-MCNC: 106 MG/DL (ref 65–99)
HCT VFR BLD AUTO: 43.9 % (ref 37.5–51)
HGB BLD-MCNC: 14.7 G/DL (ref 13–17.7)
MCH RBC QN AUTO: 29.3 PG (ref 26.6–33)
MCHC RBC AUTO-ENTMCNC: 33.5 G/DL (ref 31.5–35.7)
MCV RBC AUTO: 87.5 FL (ref 79–97)
PLATELET # BLD AUTO: 199 10*3/MM3 (ref 140–450)
PMV BLD AUTO: 7.8 FL (ref 6–12)
POTASSIUM SERPL-SCNC: 4.4 MMOL/L (ref 3.5–5.2)
RBC # BLD AUTO: 5.02 10*6/MM3 (ref 4.14–5.8)
SODIUM SERPL-SCNC: 136 MMOL/L (ref 136–145)
WBC NRBC COR # BLD AUTO: 7.9 10*3/MM3 (ref 3.4–10.8)

## 2025-03-28 PROCEDURE — 80048 BASIC METABOLIC PNL TOTAL CA: CPT

## 2025-03-28 PROCEDURE — 36415 COLL VENOUS BLD VENIPUNCTURE: CPT

## 2025-03-28 PROCEDURE — 85027 COMPLETE CBC AUTOMATED: CPT

## 2025-03-28 NOTE — DISCHARGE INSTRUCTIONS

## 2025-03-31 ENCOUNTER — APPOINTMENT (OUTPATIENT)
Dept: GENERAL RADIOLOGY | Facility: HOSPITAL | Age: 65
End: 2025-03-31
Payer: COMMERCIAL

## 2025-03-31 ENCOUNTER — ANESTHESIA EVENT (OUTPATIENT)
Dept: PERIOP | Facility: HOSPITAL | Age: 65
End: 2025-03-31
Payer: COMMERCIAL

## 2025-03-31 ENCOUNTER — HOSPITAL ENCOUNTER (OUTPATIENT)
Facility: HOSPITAL | Age: 65
Discharge: HOME | End: 2025-04-01
Attending: ORTHOPAEDIC SURGERY | Admitting: ORTHOPAEDIC SURGERY
Payer: COMMERCIAL

## 2025-03-31 ENCOUNTER — ANESTHESIA (OUTPATIENT)
Dept: PERIOP | Facility: HOSPITAL | Age: 65
End: 2025-03-31
Payer: COMMERCIAL

## 2025-03-31 DIAGNOSIS — S76.112A QUADRICEPS MUSCLE RUPTURE, LEFT, INITIAL ENCOUNTER: Primary | ICD-10-CM

## 2025-03-31 DIAGNOSIS — S76.111A QUADRICEPS MUSCLE RUPTURE, RIGHT, INITIAL ENCOUNTER: ICD-10-CM

## 2025-03-31 PROBLEM — Z98.890 STATUS POST TENDON REPAIR: Status: ACTIVE | Noted: 2025-03-31

## 2025-03-31 PROBLEM — Z98.890 S/P TENDON REPAIR: Status: ACTIVE | Noted: 2025-03-31

## 2025-03-31 PROCEDURE — 25010000002 PROPOFOL 10 MG/ML EMULSION: Performed by: NURSE ANESTHETIST, CERTIFIED REGISTERED

## 2025-03-31 PROCEDURE — 25010000002 ROPIVACAINE PER 1 MG: Performed by: ANESTHESIOLOGY

## 2025-03-31 PROCEDURE — 25010000002 CEFAZOLIN PER 500 MG: Performed by: ORTHOPAEDIC SURGERY

## 2025-03-31 PROCEDURE — 25010000002 SUGAMMADEX 200 MG/2ML SOLUTION: Performed by: NURSE ANESTHETIST, CERTIFIED REGISTERED

## 2025-03-31 PROCEDURE — 25810000003 SODIUM CHLORIDE 0.9 % SOLUTION: Performed by: ORTHOPAEDIC SURGERY

## 2025-03-31 PROCEDURE — 25010000002 ONDANSETRON PER 1 MG: Performed by: NURSE ANESTHETIST, CERTIFIED REGISTERED

## 2025-03-31 PROCEDURE — C1713 ANCHOR/SCREW BN/BN,TIS/BN: HCPCS | Performed by: ORTHOPAEDIC SURGERY

## 2025-03-31 PROCEDURE — 25010000002 HYDROMORPHONE 1 MG/ML SOLUTION: Performed by: NURSE ANESTHETIST, CERTIFIED REGISTERED

## 2025-03-31 PROCEDURE — 25810000003 LACTATED RINGERS PER 1000 ML: Performed by: ANESTHESIOLOGY

## 2025-03-31 PROCEDURE — 25810000003 LACTATED RINGERS PER 1000 ML: Performed by: ORTHOPAEDIC SURGERY

## 2025-03-31 PROCEDURE — 76000 FLUOROSCOPY <1 HR PHYS/QHP: CPT

## 2025-03-31 PROCEDURE — 25010000002 FENTANYL CITRATE (PF) 50 MCG/ML SOLUTION: Performed by: ANESTHESIOLOGY

## 2025-03-31 PROCEDURE — 25010000002 LIDOCAINE PF 2% 2 % SOLUTION: Performed by: NURSE ANESTHETIST, CERTIFIED REGISTERED

## 2025-03-31 PROCEDURE — 25010000002 DEXAMETHASONE PER 1 MG: Performed by: NURSE ANESTHETIST, CERTIFIED REGISTERED

## 2025-03-31 PROCEDURE — 25010000002 MIDAZOLAM PER 1MG: Performed by: ANESTHESIOLOGY

## 2025-03-31 PROCEDURE — 25010000002 FENTANYL CITRATE (PF) 250 MCG/5ML SOLUTION: Performed by: NURSE ANESTHETIST, CERTIFIED REGISTERED

## 2025-03-31 PROCEDURE — 73560 X-RAY EXAM OF KNEE 1 OR 2: CPT

## 2025-03-31 DEVICE — SUT TIGERLOOP 2 STR 20IN TW 7MMLP AR7234T: Type: IMPLANTABLE DEVICE | Site: LEG | Status: FUNCTIONAL

## 2025-03-31 DEVICE — SYS IMP INTERNALBRACE REPR AUG LIG: Type: IMPLANTABLE DEVICE | Site: LEG | Status: FUNCTIONAL

## 2025-03-31 DEVICE — SUT TP 1.7MM 38IN W/CUT NDL 1/2 CIR 76MM WHT/BLU: Type: IMPLANTABLE DEVICE | Site: LEG | Status: FUNCTIONAL

## 2025-03-31 RX ORDER — ALBUTEROL SULFATE 90 UG/1
INHALANT RESPIRATORY (INHALATION) AS NEEDED
Status: DISCONTINUED | OUTPATIENT
Start: 2025-03-31 | End: 2025-03-31 | Stop reason: SURG

## 2025-03-31 RX ORDER — HYDROCODONE BITARTRATE AND ACETAMINOPHEN 5; 325 MG/1; MG/1
1 TABLET ORAL EVERY 4 HOURS PRN
Status: DISCONTINUED | OUTPATIENT
Start: 2025-03-31 | End: 2025-03-31 | Stop reason: HOSPADM

## 2025-03-31 RX ORDER — ONDANSETRON 4 MG/1
4 TABLET, ORALLY DISINTEGRATING ORAL EVERY 6 HOURS PRN
Status: DISCONTINUED | OUTPATIENT
Start: 2025-03-31 | End: 2025-04-01 | Stop reason: HOSPADM

## 2025-03-31 RX ORDER — ONDANSETRON 2 MG/ML
INJECTION INTRAMUSCULAR; INTRAVENOUS AS NEEDED
Status: DISCONTINUED | OUTPATIENT
Start: 2025-03-31 | End: 2025-03-31 | Stop reason: SURG

## 2025-03-31 RX ORDER — ACETAMINOPHEN 500 MG
1000 TABLET ORAL ONCE
Status: COMPLETED | OUTPATIENT
Start: 2025-03-31 | End: 2025-03-31

## 2025-03-31 RX ORDER — FAMOTIDINE 20 MG/1
20 TABLET, FILM COATED ORAL DAILY
Status: DISCONTINUED | OUTPATIENT
Start: 2025-04-01 | End: 2025-04-01 | Stop reason: HOSPADM

## 2025-03-31 RX ORDER — CYCLOBENZAPRINE HCL 10 MG
10 TABLET ORAL 3 TIMES DAILY PRN
Qty: 30 TABLET | Refills: 0 | Status: SHIPPED | OUTPATIENT
Start: 2025-03-31

## 2025-03-31 RX ORDER — HYDROCODONE BITARTRATE AND ACETAMINOPHEN 7.5; 325 MG/1; MG/1
1 TABLET ORAL EVERY 4 HOURS PRN
Status: DISCONTINUED | OUTPATIENT
Start: 2025-03-31 | End: 2025-04-01 | Stop reason: HOSPADM

## 2025-03-31 RX ORDER — HYDROCODONE BITARTRATE AND ACETAMINOPHEN 7.5; 325 MG/1; MG/1
2 TABLET ORAL EVERY 4 HOURS PRN
Status: DISCONTINUED | OUTPATIENT
Start: 2025-03-31 | End: 2025-04-01 | Stop reason: HOSPADM

## 2025-03-31 RX ORDER — ACETAMINOPHEN 325 MG/1
650 TABLET ORAL EVERY 4 HOURS PRN
Status: DISCONTINUED | OUTPATIENT
Start: 2025-03-31 | End: 2025-04-01 | Stop reason: HOSPADM

## 2025-03-31 RX ORDER — SODIUM CHLORIDE, SODIUM LACTATE, POTASSIUM CHLORIDE, CALCIUM CHLORIDE 600; 310; 30; 20 MG/100ML; MG/100ML; MG/100ML; MG/100ML
100 INJECTION, SOLUTION INTRAVENOUS CONTINUOUS
Status: DISCONTINUED | OUTPATIENT
Start: 2025-03-31 | End: 2025-04-01 | Stop reason: HOSPADM

## 2025-03-31 RX ORDER — ONDANSETRON 2 MG/ML
4 INJECTION INTRAMUSCULAR; INTRAVENOUS EVERY 6 HOURS PRN
Status: DISCONTINUED | OUTPATIENT
Start: 2025-03-31 | End: 2025-04-01 | Stop reason: HOSPADM

## 2025-03-31 RX ORDER — MAGNESIUM HYDROXIDE 1200 MG/15ML
LIQUID ORAL AS NEEDED
Status: DISCONTINUED | OUTPATIENT
Start: 2025-03-31 | End: 2025-03-31 | Stop reason: HOSPADM

## 2025-03-31 RX ORDER — NALOXONE HCL 0.4 MG/ML
0.4 VIAL (ML) INJECTION
Status: DISCONTINUED | OUTPATIENT
Start: 2025-03-31 | End: 2025-03-31 | Stop reason: ALTCHOICE

## 2025-03-31 RX ORDER — ACETAMINOPHEN 650 MG/1
650 SUPPOSITORY RECTAL EVERY 4 HOURS PRN
Status: DISCONTINUED | OUTPATIENT
Start: 2025-03-31 | End: 2025-04-01 | Stop reason: HOSPADM

## 2025-03-31 RX ORDER — MULTIPLE VITAMINS W/ MINERALS TAB 9MG-400MCG
1 TAB ORAL DAILY
Status: DISCONTINUED | OUTPATIENT
Start: 2025-04-01 | End: 2025-04-01 | Stop reason: HOSPADM

## 2025-03-31 RX ORDER — MORPHINE SULFATE 2 MG/ML
1 INJECTION, SOLUTION INTRAMUSCULAR; INTRAVENOUS EVERY 4 HOURS PRN
Status: DISCONTINUED | OUTPATIENT
Start: 2025-03-31 | End: 2025-03-31 | Stop reason: ALTCHOICE

## 2025-03-31 RX ORDER — FENTANYL CITRATE 50 UG/ML
50 INJECTION, SOLUTION INTRAMUSCULAR; INTRAVENOUS
Status: DISCONTINUED | OUTPATIENT
Start: 2025-03-31 | End: 2025-03-31 | Stop reason: HOSPADM

## 2025-03-31 RX ORDER — FLUMAZENIL 0.1 MG/ML
0.2 INJECTION INTRAVENOUS AS NEEDED
Status: DISCONTINUED | OUTPATIENT
Start: 2025-03-31 | End: 2025-03-31 | Stop reason: HOSPADM

## 2025-03-31 RX ORDER — ASPIRIN 325 MG
325 TABLET ORAL EVERY 12 HOURS SCHEDULED
Status: DISCONTINUED | OUTPATIENT
Start: 2025-04-01 | End: 2025-04-01 | Stop reason: HOSPADM

## 2025-03-31 RX ORDER — BUSPIRONE HYDROCHLORIDE 10 MG/1
10 TABLET ORAL 3 TIMES DAILY
Status: DISCONTINUED | OUTPATIENT
Start: 2025-03-31 | End: 2025-04-01 | Stop reason: HOSPADM

## 2025-03-31 RX ORDER — SODIUM CHLORIDE 9 MG/ML
40 INJECTION, SOLUTION INTRAVENOUS AS NEEDED
Status: DISCONTINUED | OUTPATIENT
Start: 2025-03-31 | End: 2025-03-31 | Stop reason: HOSPADM

## 2025-03-31 RX ORDER — DEXAMETHASONE SODIUM PHOSPHATE 4 MG/ML
INJECTION, SOLUTION INTRA-ARTICULAR; INTRALESIONAL; INTRAMUSCULAR; INTRAVENOUS; SOFT TISSUE AS NEEDED
Status: DISCONTINUED | OUTPATIENT
Start: 2025-03-31 | End: 2025-03-31 | Stop reason: SURG

## 2025-03-31 RX ORDER — MIDAZOLAM HYDROCHLORIDE 2 MG/2ML
2 INJECTION, SOLUTION INTRAMUSCULAR; INTRAVENOUS ONCE
Status: COMPLETED | OUTPATIENT
Start: 2025-03-31 | End: 2025-03-31

## 2025-03-31 RX ORDER — NALOXONE HCL 0.4 MG/ML
0.4 VIAL (ML) INJECTION
Status: DISCONTINUED | OUTPATIENT
Start: 2025-03-31 | End: 2025-04-01 | Stop reason: HOSPADM

## 2025-03-31 RX ORDER — ROCURONIUM BROMIDE 10 MG/ML
INJECTION, SOLUTION INTRAVENOUS AS NEEDED
Status: DISCONTINUED | OUTPATIENT
Start: 2025-03-31 | End: 2025-03-31 | Stop reason: SURG

## 2025-03-31 RX ORDER — POLYETHYLENE GLYCOL 3350 17 G/17G
17 POWDER, FOR SOLUTION ORAL DAILY
Status: DISCONTINUED | OUTPATIENT
Start: 2025-04-01 | End: 2025-04-01 | Stop reason: HOSPADM

## 2025-03-31 RX ORDER — ASPIRIN 325 MG
325 TABLET, DELAYED RELEASE (ENTERIC COATED) ORAL 2 TIMES DAILY
Qty: 84 TABLET | Refills: 0 | Status: SHIPPED | OUTPATIENT
Start: 2025-03-31 | End: 2025-05-12

## 2025-03-31 RX ORDER — SODIUM CHLORIDE 0.9 % (FLUSH) 0.9 %
3 SYRINGE (ML) INJECTION EVERY 12 HOURS SCHEDULED
Status: DISCONTINUED | OUTPATIENT
Start: 2025-03-31 | End: 2025-03-31 | Stop reason: HOSPADM

## 2025-03-31 RX ORDER — LIDOCAINE HYDROCHLORIDE 10 MG/ML
0.5 INJECTION, SOLUTION EPIDURAL; INFILTRATION; INTRACAUDAL; PERINEURAL ONCE AS NEEDED
Status: DISCONTINUED | OUTPATIENT
Start: 2025-03-31 | End: 2025-03-31 | Stop reason: HOSPADM

## 2025-03-31 RX ORDER — FENTANYL CITRATE 50 UG/ML
INJECTION, SOLUTION INTRAMUSCULAR; INTRAVENOUS AS NEEDED
Status: DISCONTINUED | OUTPATIENT
Start: 2025-03-31 | End: 2025-03-31 | Stop reason: SURG

## 2025-03-31 RX ORDER — PROPOFOL 10 MG/ML
VIAL (ML) INTRAVENOUS AS NEEDED
Status: DISCONTINUED | OUTPATIENT
Start: 2025-03-31 | End: 2025-03-31 | Stop reason: SURG

## 2025-03-31 RX ORDER — ONDANSETRON 4 MG/1
4 TABLET, FILM COATED ORAL EVERY 8 HOURS PRN
Qty: 20 TABLET | Refills: 1 | Status: SHIPPED | OUTPATIENT
Start: 2025-03-31

## 2025-03-31 RX ORDER — SODIUM CHLORIDE 0.9 % (FLUSH) 0.9 %
3 SYRINGE (ML) INJECTION AS NEEDED
Status: DISCONTINUED | OUTPATIENT
Start: 2025-03-31 | End: 2025-03-31 | Stop reason: HOSPADM

## 2025-03-31 RX ORDER — IBUPROFEN 600 MG/1
600 TABLET, FILM COATED ORAL EVERY 6 HOURS PRN
Status: DISCONTINUED | OUTPATIENT
Start: 2025-03-31 | End: 2025-03-31 | Stop reason: HOSPADM

## 2025-03-31 RX ORDER — FENTANYL CITRATE 50 UG/ML
50 INJECTION, SOLUTION INTRAMUSCULAR; INTRAVENOUS ONCE
Refills: 0 | Status: COMPLETED | OUTPATIENT
Start: 2025-03-31 | End: 2025-03-31

## 2025-03-31 RX ORDER — HYDROMORPHONE HYDROCHLORIDE 1 MG/ML
0.5 INJECTION, SOLUTION INTRAMUSCULAR; INTRAVENOUS; SUBCUTANEOUS
Status: DISCONTINUED | OUTPATIENT
Start: 2025-03-31 | End: 2025-04-01 | Stop reason: HOSPADM

## 2025-03-31 RX ORDER — DIVALPROEX SODIUM 500 MG/1
500 TABLET, FILM COATED, EXTENDED RELEASE ORAL DAILY
Status: DISCONTINUED | OUTPATIENT
Start: 2025-04-01 | End: 2025-03-31

## 2025-03-31 RX ORDER — HYDROCODONE BITARTRATE AND ACETAMINOPHEN 10; 325 MG/1; MG/1
1 TABLET ORAL EVERY 4 HOURS PRN
Status: DISCONTINUED | OUTPATIENT
Start: 2025-03-31 | End: 2025-03-31 | Stop reason: HOSPADM

## 2025-03-31 RX ORDER — HYDROCODONE BITARTRATE AND ACETAMINOPHEN 7.5; 325 MG/1; MG/1
1 TABLET ORAL EVERY 4 HOURS PRN
Qty: 42 TABLET | Refills: 0 | Status: SHIPPED | OUTPATIENT
Start: 2025-03-31

## 2025-03-31 RX ORDER — NALOXONE HCL 0.4 MG/ML
0.4 VIAL (ML) INJECTION AS NEEDED
Status: DISCONTINUED | OUTPATIENT
Start: 2025-03-31 | End: 2025-03-31 | Stop reason: HOSPADM

## 2025-03-31 RX ORDER — DOCUSATE SODIUM 100 MG/1
100 CAPSULE, LIQUID FILLED ORAL 2 TIMES DAILY
Qty: 60 CAPSULE | Refills: 1 | Status: SHIPPED | OUTPATIENT
Start: 2025-03-31

## 2025-03-31 RX ORDER — ROPIVACAINE HYDROCHLORIDE 5 MG/ML
INJECTION, SOLUTION EPIDURAL; INFILTRATION; PERINEURAL
Status: COMPLETED | OUTPATIENT
Start: 2025-03-31 | End: 2025-03-31

## 2025-03-31 RX ORDER — ROSUVASTATIN CALCIUM 10 MG/1
10 TABLET, COATED ORAL NIGHTLY
Status: DISCONTINUED | OUTPATIENT
Start: 2025-03-31 | End: 2025-04-01 | Stop reason: HOSPADM

## 2025-03-31 RX ORDER — SODIUM CHLORIDE 0.9 % (FLUSH) 0.9 %
3-10 SYRINGE (ML) INJECTION AS NEEDED
Status: DISCONTINUED | OUTPATIENT
Start: 2025-03-31 | End: 2025-03-31 | Stop reason: HOSPADM

## 2025-03-31 RX ORDER — ONDANSETRON 2 MG/ML
4 INJECTION INTRAMUSCULAR; INTRAVENOUS ONCE AS NEEDED
Status: DISCONTINUED | OUTPATIENT
Start: 2025-03-31 | End: 2025-03-31 | Stop reason: HOSPADM

## 2025-03-31 RX ORDER — LIDOCAINE HYDROCHLORIDE 20 MG/ML
INJECTION, SOLUTION EPIDURAL; INFILTRATION; INTRACAUDAL; PERINEURAL AS NEEDED
Status: DISCONTINUED | OUTPATIENT
Start: 2025-03-31 | End: 2025-03-31 | Stop reason: SURG

## 2025-03-31 RX ORDER — LABETALOL HYDROCHLORIDE 5 MG/ML
5 INJECTION, SOLUTION INTRAVENOUS
Status: DISCONTINUED | OUTPATIENT
Start: 2025-03-31 | End: 2025-03-31 | Stop reason: HOSPADM

## 2025-03-31 RX ORDER — SODIUM CHLORIDE, SODIUM LACTATE, POTASSIUM CHLORIDE, CALCIUM CHLORIDE 600; 310; 30; 20 MG/100ML; MG/100ML; MG/100ML; MG/100ML
1000 INJECTION, SOLUTION INTRAVENOUS CONTINUOUS
Status: DISPENSED | OUTPATIENT
Start: 2025-03-31 | End: 2025-03-31

## 2025-03-31 RX ORDER — LISINOPRIL 20 MG/1
20 TABLET ORAL
Status: DISCONTINUED | OUTPATIENT
Start: 2025-03-31 | End: 2025-04-01 | Stop reason: HOSPADM

## 2025-03-31 RX ORDER — BUPROPION HYDROCHLORIDE 150 MG/1
150 TABLET ORAL EVERY MORNING
Status: DISCONTINUED | OUTPATIENT
Start: 2025-04-01 | End: 2025-04-01 | Stop reason: HOSPADM

## 2025-03-31 RX ORDER — SODIUM CHLORIDE 9 MG/ML
100 INJECTION, SOLUTION INTRAVENOUS CONTINUOUS
Status: DISCONTINUED | OUTPATIENT
Start: 2025-03-31 | End: 2025-04-01 | Stop reason: HOSPADM

## 2025-03-31 RX ORDER — AMLODIPINE BESYLATE 5 MG/1
5 TABLET ORAL
Status: DISCONTINUED | OUTPATIENT
Start: 2025-03-31 | End: 2025-04-01 | Stop reason: HOSPADM

## 2025-03-31 RX ADMIN — ROSUVASTATIN 10 MG: 10 TABLET, FILM COATED ORAL at 21:27

## 2025-03-31 RX ADMIN — LIDOCAINE HYDROCHLORIDE 100 MG: 20 INJECTION, SOLUTION EPIDURAL; INFILTRATION; INTRACAUDAL; PERINEURAL at 14:54

## 2025-03-31 RX ADMIN — FENTANYL CITRATE 50 MCG: 50 INJECTION, SOLUTION INTRAMUSCULAR; INTRAVENOUS at 13:58

## 2025-03-31 RX ADMIN — HYDROMORPHONE HYDROCHLORIDE 1 MG: 1 INJECTION, SOLUTION INTRAMUSCULAR; INTRAVENOUS; SUBCUTANEOUS at 16:34

## 2025-03-31 RX ADMIN — ALBUTEROL SULFATE 4 PUFF: 108 AEROSOL, METERED RESPIRATORY (INHALATION) at 15:07

## 2025-03-31 RX ADMIN — ONDANSETRON 4 MG: 2 INJECTION INTRAMUSCULAR; INTRAVENOUS at 16:32

## 2025-03-31 RX ADMIN — ACETAMINOPHEN TAB 500 MG 1000 MG: 500 TAB at 13:58

## 2025-03-31 RX ADMIN — MIDAZOLAM HYDROCHLORIDE 2 MG: 1 INJECTION, SOLUTION INTRAMUSCULAR; INTRAVENOUS at 13:58

## 2025-03-31 RX ADMIN — ROCURONIUM BROMIDE 70 MG: 10 INJECTION, SOLUTION INTRAVENOUS at 14:54

## 2025-03-31 RX ADMIN — CEFAZOLIN 2000 MG: 2 INJECTION, POWDER, FOR SOLUTION INTRAMUSCULAR; INTRAVENOUS at 21:30

## 2025-03-31 RX ADMIN — ROPIVACAINE HYDROCHLORIDE 40 ML: 5 INJECTION EPIDURAL; INFILTRATION; PERINEURAL at 14:05

## 2025-03-31 RX ADMIN — SODIUM CHLORIDE 100 ML/HR: 9 INJECTION, SOLUTION INTRAVENOUS at 21:29

## 2025-03-31 RX ADMIN — LISINOPRIL 20 MG: 20 TABLET ORAL at 21:27

## 2025-03-31 RX ADMIN — SUGAMMADEX 200 MG: 100 INJECTION, SOLUTION INTRAVENOUS at 16:31

## 2025-03-31 RX ADMIN — PROPOFOL 200 MG: 10 INJECTION, EMULSION INTRAVENOUS at 14:54

## 2025-03-31 RX ADMIN — DEXAMETHASONE SODIUM PHOSPHATE 8 MG: 4 INJECTION, SOLUTION INTRA-ARTICULAR; INTRALESIONAL; INTRAMUSCULAR; INTRAVENOUS; SOFT TISSUE at 15:07

## 2025-03-31 RX ADMIN — BUSPIRONE HYDROCHLORIDE 10 MG: 10 TABLET ORAL at 21:27

## 2025-03-31 RX ADMIN — CEFAZOLIN 2000 MG: 2 INJECTION, POWDER, FOR SOLUTION INTRAMUSCULAR; INTRAVENOUS at 14:53

## 2025-03-31 RX ADMIN — SERTRALINE HYDROCHLORIDE 50 MG: 50 TABLET ORAL at 21:28

## 2025-03-31 RX ADMIN — AMLODIPINE BESYLATE 5 MG: 5 TABLET ORAL at 21:27

## 2025-03-31 RX ADMIN — SODIUM CHLORIDE, POTASSIUM CHLORIDE, SODIUM LACTATE AND CALCIUM CHLORIDE 1000 ML: 600; 310; 30; 20 INJECTION, SOLUTION INTRAVENOUS at 09:24

## 2025-03-31 RX ADMIN — FENTANYL CITRATE 250 MCG: 50 INJECTION, SOLUTION INTRAMUSCULAR; INTRAVENOUS at 15:00

## 2025-03-31 RX ADMIN — SODIUM CHLORIDE, POTASSIUM CHLORIDE, SODIUM LACTATE AND CALCIUM CHLORIDE 100 ML/HR: 600; 310; 30; 20 INJECTION, SOLUTION INTRAVENOUS at 18:04

## 2025-03-31 NOTE — DISCHARGE INSTRUCTIONS
Lower Extremity Post-op Instructions    Dr. Blanton      POST-OP CARE: Please follow these instructions closely!    Weight Bearing: Your surgery requires that you have the following weight bearing restrictions:   __X__Nonweightbearing of the bilateral lower extremities while the bilateral blocks are in effect, but once restored sensation and motor, you may begin weight bearing as tolerated with knees locked in full extension using the knee immobilizers at all times.  Avoid bending the knees at all.  You may ambulate with or without crutches/walker as desired.    IMPORTANT PHONE NUMBERS:  For emergencies, please call [phone]  You may reach Dr. Blanton or his office medical staff at [phone] EXT: [phone]  M-F 8:00am-5:00pm  After 5pm or on the weekends, please call [phone] to reach the doctor on call.  Call immediately if you have any of the following symptoms:  Elevated temperature above 101 degrees for more than 48hours after surgery  Persistent drainage from wound  Severe pain around surgical site  Calf pain  Any signs of infection    Dressings:   Do NOT remove dressing/splint unless noted below. SOME DRAINAGE IS NORMAL!  DO NOT touch, remove, or apply ointment to the incision and/or steri strips  Signs of infection that warrant a phone call to our clinical line:  Excessive drainage or redness  Red streaking coming away from the incision  Increased pain  Increased temperature above 101 degrees    Sutures:  If your physician uses sutures in your knee, they will dissolve on their own and will not need to be removed.  Some black sutures occasionally used will need to be removed 10-14 days after surgery.    Bathing:    _X_ Keep your adhesive dressings in place until follow up.  You may remove your ACE wraps and begin showering on the 3rd day following surgery.  Please check the border of the dressing(s) to ensure there is a good seal at each site before each showering session.  Water may cascade over your dressing(s), but  do not submerge your dressing(s) and incision(s) in water.  Please ensure that you are knees remain fully straight during each showering session.  Replace your knee immobilizers immediately upon completion before exiting the shower.    Elevate: Place 2 pillows under your ankle to get the incision area above the level of the heart to help in swelling (a recliner is not elevated!!!)    Ice: Ice your surgery site 5-6 times per day for 20 minutes at a time with dressing in place. You should wait at least 30 minutes before icing again to avoid ice irritation. It may be difficult at first to ice the surgery area due to the amount of dressing, but continue to be diligent with icing.  Your dressings will be taken down at your first post-op appointment.     Range of motion: NO RANGE OF MOTION THROUGH EITHER KNEE WITH KNEES LOCKED IN FULL EXTENSION AT ALL TIMES WITH KNEE IMMOBILIZERS IN PLACE  For the knee- It is important to keep the knee as straight as possible following surgery. NO PILLOWS UNDER THE KNEE, ONLY under the ankle  For the foot/ankle- range of motion restrictions will be given to you at your first post-op appointment. Until that time, avoid any unnecessary range o f motion  Physical therapy- Your physical therapy status will be discussed with you at your first post-op appointment.   **Achieving range of motion goals and decreasing swelling/inflammation are the primary focus for the first two (2) weeks following surgery. **    Medications: You will be discharged with the appropriate medications following your surgery. Fill these at the pharmacy and take them as directed on the label.   Possible medications that will be prescribed are below.  You may or may not receive all of these. Occasionally, additional medications may be given with specific instructions.    Percocet/Lortab (oxycodone/hydrocodone with tylenol) - Pain Medication.  Take one tablet every 4-6 hours. DO NOT EXCEED 4,000mg of Tylenol in 24  hours.  **DO NOT MIX WITH ALCOHOL, DRIVE WHILE TAKING, OR TAKE EXTRA TYLENOL*     Zofran - Anti-nausea medication to help prevent nausea and vomiting after surgery.     Aspirin 325 mg - all patients with lower extremity surgery should take one aspirin 325 mg tablet every 12 hours (twice daily) for 42 days after surgery    **If you are running low on pain medications, please notify us if you need a refill 24-48 hours prior to when you run out, so we can make arrangements to refill the prescription for you if we determine it is necessary**

## 2025-03-31 NOTE — BRIEF OP NOTE
QUADRICEPS TENDON REPAIR  Progress Note    Mariano Al  3/31/2025    Pre-op Diagnosis:   S76.112A, S76.111A       Post-Op Diagnosis Codes:  SAME    Procedure(s):  Procedure(s):  BILATERAL QUAD TENDON RUPTURE REPAIR ( BILATERAL QUAD TENDON REPAIR)    Surgeon(s):  Dipesh Blanton MD    Anesthesia: General with Block    Staff:   Circulator: Mi Small RN; Rory Thornton RN; Rod Singh RN  Scrub Person: Pito Quintanilla; Dahlia Manning; Isidro Corrales  Vendor Representative: Ming Oreilly     Estimated Blood Loss:  < 50 mL    Urine Voided: * No values recorded between 3/31/2025  2:50 PM and 3/31/2025  4:44 PM *    Specimens:                None      Drains: * No LDAs found *    Findings: Bilateral quad tendon ruptures    Complications: None     was responsible for performing the following activities: Retraction, Suction, Irrigation, Suturing, Closing, and Placing Dressing and their skilled assistance was necessary for the success of this case.    Dipesh Blanton MD     Date: 3/31/2025  Time: 16:44 CDT

## 2025-03-31 NOTE — ANESTHESIA PREPROCEDURE EVALUATION
Anesthesia Evaluation     no history of anesthetic complications:   NPO Solid Status: > 8 hours  NPO Liquid Status: > 8 hours           Airway   Mallampati: III  Possible difficult intubation  Dental      Pulmonary    (+) ,sleep apnea  Cardiovascular   Exercise tolerance: good (4-7 METS)    (+) hypertension, hyperlipidemia  (-) past MI      Neuro/Psych  (+) headaches, psychiatric history Anxiety and Depression  (-) seizures, TIA, CVA  GI/Hepatic/Renal/Endo    (+) renal disease (single functional kidney)- CRI  (-) liver disease, diabetes    Musculoskeletal         ROS comment: Fall from truck 9 days ago, bilateral quad rupture  Abdominal    Substance History      OB/GYN          Other   arthritis,                 Anesthesia Plan    ASA 2     general with block     intravenous induction     Anesthetic plan, risks, benefits, and alternatives have been provided, discussed and informed consent has been obtained with: patient.    CODE STATUS:

## 2025-03-31 NOTE — H&P
Pt Name: Mariano Al  MRN: [id number]  YOB: 1960  Date: 3/31/2025      HPI: 64 y.o. year old male with a known quad tendon tears of the bilateral knees.  Based on the patient's extensor mechanism disruption bilaterally, I recommended primary repair.      Past Medical/Surgical History:   Past Medical History:   Diagnosis Date    Anxiety     Chronic kidney disease     Depression     Encounter for imaging to screen for eye metal prior to magnetic resonance imaging (MRI) 03/26/2025    No metallic foreign bodies seen in CT max/facial 04/13/2024 per Dr. Macias.    Headache     Hyperlipidemia     Hypertension     Hypogonadism in male     Mood disorder     Osteoarthritis     PTSD (post-traumatic stress disorder)     Sinusitis     Sleep disturbance     Traumatic brain injury      Past Surgical History:   Procedure Laterality Date    COLONOSCOPY      HIP PERCUTANEOUS PINNING Left     JOINT REPLACEMENT Left     hip         Social History:   Smoking:  positive for approximately 1 packs per day.  Patient advised to quit smoking  Alcohol: occasional/infrequent    Medications:   Current Facility-Administered Medications   Medication Dose Route Frequency Provider Last Rate Last Admin    ceFAZolin 2000 mg IVPB in 100 mL NS (MBP)  2,000 mg Intravenous Once Dipesh Blanton MD        lactated ringers infusion  100 mL/hr Intravenous Continuous Chantell Garcia MD        lidocaine PF 1% (XYLOCAINE) injection 0.5 mL  0.5 mL Intradermal Once PRN Dipesh Blanton MD        sodium chloride 0.9 % flush 3 mL  3 mL Intravenous PRN Dipesh Blanton MD        sodium chloride 0.9 % flush 3 mL  3 mL Intravenous Q12H Chantell Garcia MD        sodium chloride 0.9 % flush 3-10 mL  3-10 mL Intravenous PRN Chantell Garcia MD        sodium chloride 0.9 % infusion 40 mL  40 mL Intravenous PRN Chantell Garcia MD           Allergies:   Allergies   Allergen Reactions    Morphine Delirium     Triggers ptsd and does not  wake up well from it    Penicillins Rash       Review of systems:     * Constitutional: negative     * HEENT: negative     * Skin: negative     * Cardiac: negative     * Respiratory: negative     * GI: negative     * : negative     * Neuro: negative     * CNS: negative     * Extremities: negative     * Endcrine: negative     Physical Exam:   /90   Pulse 91   Temp 97.3 °F (36.3 °C) (Temporal)   Resp 18   SpO2 93%     - General: normal development and appearance     - HEENT: normocephalic, ZONIA     - Skin: pink, warm, normal tone     - Neck: supple, no masses,     - Chest: no rales or wheezes, normal expansion     - Heart: RRR, no murmurs/gallops     - Abdomen: soft, nondistended, nontender, normal sounds     - Vascular: normal pulses, color, tone     - Pysch/Neuro: normal affect, mood, alert and oriented     - Musculoskeletal: Physical exam of the patient's bilateral knees shows the skin is clean, dry, and intact with resolving ecchymosis. No deformities, lesions, or errythema noted, but there is palpable defect at the superior pole of the patella is consistent with extensor tendon disruption. NVI distally. Tenderness to palpation anteriorly at the superior pole the patella. Decreased range of motion and weakness secondary to pain.     Impression: Acute quadriceps tendon ruptures of the bilateral knees.      Surgical Plan: Primary repair of the quadriceps tendons of the bilateral knees.      Electronically signed by Dipesh Blanton MD on 3/31/2025 at 14:40 CDT

## 2025-03-31 NOTE — ANESTHESIA POSTPROCEDURE EVALUATION
Patient: Mariano Al    Procedure Summary       Date: 03/31/25 Room / Location: [facility] OR  /  [facility] OR    Anesthesia Start: 1453 Anesthesia Stop: 1652    Procedure: BILATERAL QUAD TENDON RUPTURE REPAIR ( BILATERAL QUAD TENDON REPAIR) (Bilateral: Thigh) Diagnosis: (S76.112A, S76.111A)    Surgeons: Dipesh Blanton MD Provider: RACIEL Kennedy CRNA    Anesthesia Type: general with block ASA Status: 2            Anesthesia Type: general with block    Vitals  Vitals Value Taken Time   /86 03/31/25 17:45   Temp 98 °F (36.7 °C) 03/31/25 17:35   Pulse 78 03/31/25 17:45   Resp 14 03/31/25 17:45   SpO2 93 % 03/31/25 17:45           Post Anesthesia Care and Evaluation    Patient location during evaluation: PACU  Patient participation: complete - patient participated  Level of consciousness: awake and alert  Pain score: 0  Pain management: adequate    Airway patency: patent  Anesthetic complications: No anesthetic complications  PONV Status: none  Cardiovascular status: acceptable  Respiratory status: acceptable  Hydration status: acceptable  Post Neuraxial Block status: Motor and sensory function returned to baseline and No signs or symptoms of PDPH  Comments: Patient discharged according to acceptable Colt score per RN assessment. See nursing records for further information.     Blood pressure 139/86, pulse 78, temperature 98 °F (36.7 °C), temperature source Temporal, resp. rate 14, SpO2 93%.

## 2025-03-31 NOTE — ANESTHESIA PROCEDURE NOTES
Airway  Reason: elective    Date/Time: 3/31/2025 2:56 PM  Airway not difficult    General Information and Staff    Patient location during procedure: OR  CRNA/CAA: RACIEL Kennedy CRNA    Indications and Patient Condition  Indications for airway management: airway protection    Preoxygenated: yes  MILS maintained throughout    Mask difficulty assessment: 1 - vent by mask    Final Airway Details    Final airway type: endotracheal airway      Successful airway: ETT  Cuffed: yes   Successful intubation technique: direct laryngoscopy  Adjuncts used in placement: intubating stylet  Endotracheal tube insertion site: oral  Blade: García  Blade size: 3  ETT size (mm): 7.5  Cormack-Lehane Classification: grade I - full view of glottis  Placement verified by: chest auscultation and capnometry   Cuff volume (mL): 6  Measured from: lips  ETT/EBT  to lips (cm): 21  Number of attempts at approach: 1  Assessment: lips, teeth, and gum same as pre-op and atraumatic intubation             LMTCB

## 2025-03-31 NOTE — OP NOTE
Patient Name: Donya  : 1960  MRN: [id number]    DATE of SURGERY: 3/31/2025     SURGEON:  Dipesh Blanton MD     ASSISTANT:  None      ESTIMATED BLOOD LOSS:  < 50 mL     COMPLICATIONS:  None.     CONDITION:  Stable     PREOPERATIVE DIAGNOSIS:  Acute spontaneous rupture of the bilateral quadriceps tendons.     POSTOPERATIVE DIAGNOSIS:  Acute spontaneous rupture of the bilateral quadriceps tendons.     PROCEDURES PERFORMED:  Primary repair, bilateral quadriceps tendons     IMPLANTS:  Arthrex 1.7 FiberTape x 4; Arthrex 4.75 mm BioSwiveLock anchor x 2.     ANESTHESIA USED:  General endotracheal anesthesia.     OPERATIVE INDICATIONS:  The patient is a 64 y.o. male who imposed an eccentric load to his bilateral lower extremities while jumping out of his horse trailer.  An x-ray showed patella baja suspicious for possible quad tendon rupture bilaterally which was confirmed by follow-up MRIs.  I recommended open repair of each.  Risks include but are not limited to that of anesthesia, bleeding, infection, pain, damage to local structures, need for further surgery, failure to heal, failure of repair and loss of fixation.  Risks, benefits, and alternatives were discussed, and the patient wished to proceed.     ESTIMATED BLOOD LOSS:  Less than 50 mL.     SPECIMENS:  None.     DRAINS:  None.     COMPLICATIONS:  None.     PROCEDURE IN DETAIL:  The patient was seen in the preoperative holding room. Once again the informed consent form was reviewed with the patient and signed. The site of surgery was marked with the patient's agreement.  The patient was transported to the operating room where a timeout was performed identifying the correct patient, as well as the operative sites.  Two grams of IV Kefzol were given as perioperative antibiotics.  bilateral lower extremity were prepped and draped in usual sterile fashion.  Tourniquets were inflated to 250 mmHg in sequence.  Total tourniquet time was approximately 1 hour  per side.     Attention was initially turned to the left leg.  An anterior midline approach was made over the extensor mechanism at the knee.  Sharp dissection was carried down to the fascial plane where an obvious quadriceps disruption was encountered.  The free edges of the tendon and patella were debrided back to remove any frayed nonviable tissue.  The hematoma in the area was cleared.  A series of Krakauer sutures through the substance of the quadricep muscle and tendon were made in both retrograde and antegrade fashions on the medial and lateral sides of the quadricep.  Transosseous 2.5 mm tunnels were drilled in parallel in a retrograde fashion through the patella and a suture lasso was used to pass and retrieve the free tails of the fiber tapes through the inferior pole of the patella.  The tails were then collected and crossed across the anterior aspect of the patella and again retrieved in an antegrade fashion through the patella once again creating a double passed tension band speed bridge construct.  The free tails were then tensioned and then placed into a drilled and tapped recipient site for a 4.75 mm bio swivel lock anchor in the inferior pole of the patella.  The remaining 2 tails of the Arthrex 1.7 FiberTape were then passed around the periphery of the patella both medially and laterally and through the substance of the patellar tendon before being tied together to complete a secondary cerclage repair.  The repair demonstrated appropriate apposition of the quadriceps tendon to the patella and the knee was able to be ranged without obvious diastases up to 90 degrees.  The left lower extremity incision was irrigated, followed by closure in layers.  The skin was sealed with a Dermabond adhesive skin dressing.  The left lower extremity tourniquet was then deflated.    Attention was then turned to the right lower extremity where an identical procedure was performed.  The right lower extremity was  inflated to 250 mmHg.  An anterior midline approach was made over the extensor mechanism at the knee.  Sharp dissection was carried down to the fascial plane where an obvious quadriceps disruption was encountered.  The free edges of the tendon and patella were debrided back to remove any frayed nonviable tissue.  The hematoma in the area was cleared.  A series of Krakauer sutures through the substance of the quadricep muscle and tendon were made in both retrograde and antegrade fashions on the medial and lateral sides of the quadricep.  Transosseous 2.5 mm tunnels were drilled in parallel in a retrograde fashion through the patella and a suture lasso was used to pass and retrieve the free tails of the fiber tapes through the inferior pole of the patella.  The tails were then collected and crossed across the anterior aspect of the patella and again retrieved in an antegrade fashion through the patella once again creating a double passed tension band speed bridge construct.  The free tails were then tensioned and then placed into a drilled and tapped recipient site for a 4.75 mm bio swivel lock anchor in the inferior pole of the patella.  The remaining 2 tails of the Arthrex 1.7 FiberTape were then passed around the periphery of the patella both medially and laterally and through the substance of the patellar tendon before being tied together to complete a secondary cerclage repair.  The repair demonstrated appropriate apposition of the quadriceps tendon to the patella and the knee was able to be ranged without obvious diastases up to 90 degrees.  The right lower extremity incision was irrigated, followed by closure in layers.  The skin was sealed with a Dermabond adhesive skin dressing.  The right lower extremity tourniquet was then deflated     A well-padded sterile dressing was placed over each incision followed by a knee immobilizer placed to each lower extremity. The patient was awakened from anesthesia and  transported to recovery in stable condition.     POSTOPERATIVE PLAN:  1) Discharge home with family  2) Return to clinic in 2 weeks for a clinical check  3) Nonweightbearing on the operative extremities while block in effect, then once it wears off, patient may WBAT with knees locked in extension (pegleg style) with knee immobilizers in place at all times.    Dipesh Blanton MD     Date: 3/31/2025  Time: 14:52 CDT    Please note that portions of this note were completed with a voice recognition program.

## 2025-03-31 NOTE — ANESTHESIA PROCEDURE NOTES
Peripheral Block    Pre-sedation assessment completed: 3/31/2025 1:59 PM    Patient reassessed immediately prior to procedure    Patient location during procedure: holding area  Start time: 3/31/2025 2:00 PM  Stop time: 3/31/2025 2:05 PM  Reason for block: procedure for pain, at surgeon's request, post-op pain management and Requested by Dr. Blanton  Performed by  Anesthesiologist: Chantell Garcia MD  Preanesthetic Checklist  Completed: patient identified, IV checked, site marked, risks and benefits discussed, surgical consent, monitors and equipment checked, pre-op evaluation and timeout performed  Prep:  Pt Position: supine  Prep: ChloraPrep  Patient monitoring: blood pressure monitoring, continuous pulse oximetry and EKG  Procedure    Sedation: yes  Performed under: local infiltration  Guidance:ultrasound guided and femoral artery identified in adductor canal and local anesthetic seen surrounding artery    ULTRASOUND INTERPRETATION.  Using ultrasound guidance a 20 G gauge needle was placed in close proximity to the nerve, at which point, under ultrasound guidance anesthetic was injected in the area of the nerve and spread of the anesthesia was seen on ultrasound in close proximity thereto.  There were no abnormalities seen on ultrasound; a digital image was taken; and the patient tolerated the procedure with no complications. Images:still images obtained (picture printed and placed in patients chart)    Laterality:Bilateral  Block Type:adductor canal block  Injection Technique:single-shot  Needle Type:echogenic      Medications Used: ropivacaine (NAROPIN) injection 0.5 % - Injection   40 mL - 3/31/2025 2:05:00 PM      Post Assessment  Injection Assessment: negative aspiration for heme, no paresthesia on injection and incremental injection  Patient Tolerance:comfortable throughout block  Complications:no  Performed by: Chantell Garcia MD

## 2025-04-01 VITALS
WEIGHT: 205.4 LBS | BODY MASS INDEX: 32.24 KG/M2 | TEMPERATURE: 98.2 F | HEART RATE: 109 BPM | HEIGHT: 67 IN | RESPIRATION RATE: 16 BRPM | DIASTOLIC BLOOD PRESSURE: 89 MMHG | OXYGEN SATURATION: 94 % | SYSTOLIC BLOOD PRESSURE: 157 MMHG

## 2025-04-01 PROCEDURE — 25010000002 CEFAZOLIN PER 500 MG: Performed by: ORTHOPAEDIC SURGERY

## 2025-04-01 PROCEDURE — 25810000003 SODIUM CHLORIDE 0.9 % SOLUTION: Performed by: ORTHOPAEDIC SURGERY

## 2025-04-01 RX ADMIN — BUPROPION HYDROCHLORIDE 150 MG: 150 TABLET, EXTENDED RELEASE ORAL at 06:15

## 2025-04-01 RX ADMIN — ASPIRIN 325 MG ORAL TABLET 325 MG: 325 PILL ORAL at 08:00

## 2025-04-01 RX ADMIN — HYDROCODONE BITARTRATE AND ACETAMINOPHEN 2 TABLET: 7.5; 325 TABLET ORAL at 07:52

## 2025-04-01 RX ADMIN — POLYETHYLENE GLYCOL 3350 17 G: 17 POWDER, FOR SOLUTION ORAL at 08:05

## 2025-04-01 RX ADMIN — Medication 1 TABLET: at 08:00

## 2025-04-01 RX ADMIN — SODIUM CHLORIDE 100 ML/HR: 9 INJECTION, SOLUTION INTRAVENOUS at 06:16

## 2025-04-01 RX ADMIN — BUSPIRONE HYDROCHLORIDE 10 MG: 10 TABLET ORAL at 08:00

## 2025-04-01 RX ADMIN — CEFAZOLIN 2000 MG: 2 INJECTION, POWDER, FOR SOLUTION INTRAMUSCULAR; INTRAVENOUS at 06:16

## 2025-04-01 RX ADMIN — HYDROCODONE BITARTRATE AND ACETAMINOPHEN 2 TABLET: 7.5; 325 TABLET ORAL at 04:10

## 2025-04-01 RX ADMIN — FAMOTIDINE 20 MG: 20 TABLET, FILM COATED ORAL at 08:00

## 2025-04-01 NOTE — PLAN OF CARE
Goal Outcome Evaluation:  Plan of Care Reviewed With: patient             Outcome Evaluation: Pt is A&Ox4. Up x2 assit and WBAT. Pt cannot bend knees. BLE incisions with immobilizers on and ace wrap. No drainage. IV d/c. Discharge education done. Transported home with family. Safety maintained.

## 2025-04-01 NOTE — PLAN OF CARE
Goal Outcome Evaluation:  Plan of Care Reviewed With: patient, spouse        Progress: improving  Outcome Evaluation: Pt A/Ox4, pleasant and cooperative. Increased feeling in BLE. Pain medication given x1 per pt request. IVF infusing at 100 mL/hr. IV abx given as scheduled. VSS. RA. Safety maintained. Call light in reach.

## 2025-04-01 NOTE — PLAN OF CARE
Goal Outcome Evaluation:  Plan of Care Reviewed With: patient           Outcome Evaluation: Pt is A&Ox4. Nonweight bearing due to BLE block. BLE wrapped in ace wrap and immobilizers. IV maintained. Some numbness and tingling in BLE. No c/o pain this shift. Awaiting orders. Family at bedside. VSS. Call light within reach. Safety maintained.

## 2025-04-02 ENCOUNTER — TELEPHONE (OUTPATIENT)
Age: 65
End: 2025-04-02

## 2025-04-02 DIAGNOSIS — S76.111A RUPTURE OF RIGHT QUADRICEPS TENDON, INITIAL ENCOUNTER: ICD-10-CM

## 2025-04-02 DIAGNOSIS — S76.112A RUPTURE OF LEFT QUADRICEPS TENDON, INITIAL ENCOUNTER: Primary | ICD-10-CM

## 2025-04-02 NOTE — PROGRESS NOTES
"  Orthopedic Surgery Bilateral Quad Tendon Repair Progress Note        Mariano Al  4/2/2025  POD# 2 Days Post-Op    Subjective:     Interval: Block effects have worn off.  Patient ready to go home.    Objective:     General: A&O x3, NAD, No signs or symptoms of PE.   Wound: clean, dry, intact             Dressing: Clean, Dry, Intact   Extremity: Distal NVI, Soft throughout           DVT Exam: No evidence of DVT seen on physical exam.                   Data Review:  Vitals:  /89 (BP Location: Right arm, Patient Position: Lying)   Pulse 109   Temp 98.2 °F (36.8 °C) (Oral)   Resp 16   Ht 170.2 cm (67.01\")   Wt 93.2 kg (205 lb 6.4 oz)   SpO2 94%   BMI 32.16 kg/m²   No data recorded.      I/O (24Hr):  No intake or output data in the 24 hours ending 04/02/25 0659    Labs:  Lab Results (last 72 hours)       ** No results found for the last 72 hours. **            Assessment:     Status post tendon repair  POD 2 Days Post-Op BILATERAL QUAD TENDON RUPTURE REPAIR ( BILATERAL QUAD TENDON REPAIR) (Bilateral)    Plan:      1:  DVT prophylaxis, ICE, elevate  2:  Pain control  3:  Physical therapy/Occupation therapy  4:  Anticipate discharge today if pain well controlled  5:  WBAT operative BL extremities w/ knee immobilizers in place at all times.    Electronically signed by Dipesh Blanton MD on 4/2/2025 at 06:59 CDT      "

## 2025-04-02 NOTE — TELEPHONE ENCOUNTER
Ivory from [facility] is requesting a hospital bed and a trapeze bar sent to [facility] pharmacy for the patient

## 2025-04-02 NOTE — DISCHARGE SUMMARY
Orthopaedic Surgery Discharge Summary  Dipesh Blanton MD      NAME: Mariano Al  : 1960  MRN: [id number]      Admission Date: 3/31/2025    Discharge Date: 2025    Admission Diagnosis: BILATERAL QUAD TENDON RUPTURE    Discharge Diagnoses: Same    Procedures: BILATERAL QUAD TENDON RUPTURE REPAIR ( BILATERAL QUAD TENDON REPAIR) (Bilateral)    Consultations:     Reason for Admission: BILATERAL QUAD TENDON RUPTURE    Hospital Course:  The patient was admitted with the above named diagnosis, surgery was performed and tolerated well.  At the time of discharge, the patient was afebrile, vitals stable, pain was controlled with oral medication, they were tolerating a by mouth diet, and voiding appropriately. Physical therapy and occupational therapy were consulted. Given these findings they were deemed suitable to be discharged.    Disposition: Home    Activity: WBAT bilateral lower extremities with knee immobilizers in place at all times.    Wound Instructions: see postop instructions    Diet: regular diet    Resume home meds: Per AVS    Prescriptions for:  Norco 7.5/325, #42    Return to Clinic: 2 weeks    Xrays: Yes      Dipesh Blanton MD  2025  07:01 CDT

## 2025-04-03 DIAGNOSIS — S76.112A QUADRICEPS MUSCLE RUPTURE, LEFT, INITIAL ENCOUNTER: Primary | ICD-10-CM

## 2025-04-03 DIAGNOSIS — S76.111A QUADRICEPS MUSCLE RUPTURE, RIGHT, INITIAL ENCOUNTER: ICD-10-CM

## 2025-04-03 DIAGNOSIS — Z98.890 STATUS POST TENDON REPAIR: ICD-10-CM

## 2025-04-03 DIAGNOSIS — Z98.890 S/P TENDON REPAIR: ICD-10-CM

## 2025-04-15 ENCOUNTER — OFFICE VISIT (OUTPATIENT)
Age: 65
End: 2025-04-15

## 2025-04-15 ENCOUNTER — TELEPHONE (OUTPATIENT)
Dept: INTERNAL MEDICINE | Facility: CLINIC | Age: 65
End: 2025-04-15
Payer: COMMERCIAL

## 2025-04-15 VITALS — HEIGHT: 67 IN | BODY MASS INDEX: 32.18 KG/M2 | WEIGHT: 205 LBS

## 2025-04-15 DIAGNOSIS — N18.31 CKD STAGE 3A, GFR 45-59 ML/MIN: Primary | ICD-10-CM

## 2025-04-15 DIAGNOSIS — S76.112D RUPTURE OF LEFT QUADRICEPS TENDON, SUBSEQUENT ENCOUNTER: Primary | ICD-10-CM

## 2025-04-15 DIAGNOSIS — S76.111D RUPTURE OF RIGHT QUADRICEPS TENDON, SUBSEQUENT ENCOUNTER: ICD-10-CM

## 2025-04-15 PROCEDURE — 99024 POSTOP FOLLOW-UP VISIT: CPT | Performed by: ORTHOPAEDIC SURGERY

## 2025-04-15 RX ORDER — CYCLOBENZAPRINE HCL 10 MG
10 TABLET ORAL 3 TIMES DAILY PRN
COMMUNITY
Start: 2025-03-31

## 2025-04-15 RX ORDER — ASPIRIN 325 MG
325 TABLET, DELAYED RELEASE (ENTERIC COATED) ORAL 2 TIMES DAILY
COMMUNITY
Start: 2025-03-31 | End: 2025-05-13

## 2025-04-15 RX ORDER — HYDROCODONE BITARTRATE AND ACETAMINOPHEN 7.5; 325 MG/1; MG/1
1 TABLET ORAL EVERY 4 HOURS PRN
COMMUNITY
Start: 2025-03-31

## 2025-04-15 RX ORDER — ONDANSETRON 4 MG/1
4 TABLET, FILM COATED ORAL EVERY 8 HOURS PRN
COMMUNITY
Start: 2025-03-31

## 2025-04-15 RX ORDER — PSEUDOEPHEDRINE HCL 30 MG
100 TABLET ORAL 2 TIMES DAILY
COMMUNITY
Start: 2025-03-31

## 2025-04-15 NOTE — TELEPHONE ENCOUNTER
Patient fell and tore tendons in both knees.  He has had surgery and Dr Blanton put him on aspirin twice a day.  Dr Blanton told him he should talk to Dr Guadarrama about having labs to check his kidney function (he only has one kidney).      Please let patient's wife know

## 2025-04-16 NOTE — TELEPHONE ENCOUNTER
LVM informing that he can come for lab at his convenience and/or can also to the Primary Care in [place] for lab if that would be more convenient.  Order pended to provider.

## 2025-04-17 ASSESSMENT — ENCOUNTER SYMPTOMS
ALLERGIC/IMMUNOLOGIC NEGATIVE: 1
RESPIRATORY NEGATIVE: 1
EYES NEGATIVE: 1
GASTROINTESTINAL NEGATIVE: 1

## 2025-04-18 NOTE — PROGRESS NOTES
[facility]  [facility]  [address]  [place] [id number] [phone]       Hugo Gama (:  1960) is a 64 y.o. male,Established patient, here for evaluation of the following chief complaint(s):  Post-Op Check (BILATERAL QUAD TENDON REPAIR sx: 2025)        Assessment & Plan  1. Bilateral quadriceps tendon tears:  The incisions are healing well, with the mesh expected to detach naturally within a week to 10 days. No knee flexion is allowed for the initial 6 weeks post-surgery. The mesh should be left in place. Ambulation with a straight leg is permitted, and walking outside is allowed. At the next visit in 4 weeks, knee flexion exercises will begin, starting with 30 degrees for 1 to 2 weeks, then progressing to 60 degrees, and finally 90 degrees. Formal therapy will involve unlocking the knee to 30 degrees of flexion for 2 weeks, then 60 degrees for 2 weeks, and finally 90 degrees for 2 weeks. Once 90 degrees of knee flexion is achieved, walking with braces online can commence.    2. Medication management:  Aspirin should be continued for a total of 6 weeks post-surgery due to the elevated clotting cascade during this period. Renal function will be monitored, especially considering the patient has one kidney and is on a water regimen to ensure adequate hydration.    Follow-up in 4 weeks.       ICD-10-CM    1. Rupture of left quadriceps tendon, subsequent encounter  S76.112D       2. Rupture of right quadriceps tendon, subsequent encounter  S76.111D           No follow-ups on file.    SUBJECTIVE/OBJECTIVE:  History of Present Illness  The patient presents for evaluation of bilateral quadriceps tendon tears.     The chief complaint is discomfort and numbness in the foot when seated, attributed to a sensation of one leg being longer than the other. He has been adhering to the prescribed regimen of not bending his knees. A

## 2025-04-22 ENCOUNTER — TELEPHONE (OUTPATIENT)
Age: 65
End: 2025-04-22

## 2025-04-24 ENCOUNTER — OFFICE VISIT (OUTPATIENT)
Dept: INTERNAL MEDICINE | Facility: CLINIC | Age: 65
End: 2025-04-24
Payer: COMMERCIAL

## 2025-04-24 VITALS
OXYGEN SATURATION: 96 % | DIASTOLIC BLOOD PRESSURE: 72 MMHG | HEIGHT: 67 IN | BODY MASS INDEX: 32.18 KG/M2 | WEIGHT: 205 LBS | SYSTOLIC BLOOD PRESSURE: 114 MMHG | HEART RATE: 76 BPM | TEMPERATURE: 97.8 F

## 2025-04-24 DIAGNOSIS — N18.31 CKD STAGE 3A, GFR 45-59 ML/MIN: Primary | ICD-10-CM

## 2025-04-24 DIAGNOSIS — M10.9 GOUT, UNSPECIFIED CAUSE, UNSPECIFIED CHRONICITY, UNSPECIFIED SITE: ICD-10-CM

## 2025-04-24 DIAGNOSIS — S76.111A QUADRICEPS MUSCLE RUPTURE, RIGHT, INITIAL ENCOUNTER: ICD-10-CM

## 2025-04-24 DIAGNOSIS — S76.112A QUADRICEPS MUSCLE RUPTURE, LEFT, INITIAL ENCOUNTER: ICD-10-CM

## 2025-04-24 NOTE — PROGRESS NOTES
"      Chief Complaint  Gout (Left foot /Got not long after surgery/Blanton told him he could not be on any thing due to interfering with healing- but it has just gotten worse and worse ) and other (Worried about kidney function due to increased aspirin use- do you think it needs to be checked )    Subjective        Mariano Al presents to [facility]    HPI    Patient here for the above problems.  See Assessment and Plan for further HPI components.      Review of Systems    Objective   Vital Signs:  /72 (BP Location: Left arm, Patient Position: Sitting, Cuff Size: Adult)   Pulse 76   Temp 97.8 °F (36.6 °C) (Temporal)   Ht 170.2 cm (67.01\")   Wt 93 kg (205 lb)   SpO2 96%   BMI 32.10 kg/m²   Estimated body mass index is 32.1 kg/m² as calculated from the following:    Height as of this encounter: 170.2 cm (67.01\").    Weight as of this encounter: 93 kg (205 lb).      Physical Exam  Vitals and nursing note reviewed.   Constitutional:       Appearance: He is not ill-appearing.   Eyes:      General: No scleral icterus.     Conjunctiva/sclera: Conjunctivae normal.   Pulmonary:      Effort: Pulmonary effort is normal. No respiratory distress.   Musculoskeletal:        Feet:    Neurological:      General: No focal deficit present.      Mental Status: He is alert and oriented to person, place, and time.   Psychiatric:         Mood and Affect: Mood normal.         Behavior: Behavior normal.                         Assessment and Plan   Diagnoses and all orders for this visit:    1. CKD stage 3a, GFR 45-59 ml/min (Primary)  -     Uric acid  -     Comprehensive metabolic panel    2. Gout, unspecified cause, unspecified chronicity, unspecified site  -     Uric acid  -     Comprehensive metabolic panel    3. Quadriceps muscle rupture, left, initial encounter    4. Quadriceps muscle rupture, right, initial encounter      Patient presents today for follow-up.  Patient had a bilateral " quadriceps tendon rupture.  Patient went and saw the urgent care on 3/22/2025.  The patient subsequently followed up with Dr. Casas on 3/24/2025.  Dr. Casas was concerned about rupture of quadriceps tendon and subsequently ordered an MRI of the left and right knee.  This was subsequently performed on3/26/2025.  On the left side it said a complete disruption of the distal quadriceps tendon at the patellar attachment.  On the MRI of the right side that showed high-grade tear of the quadriceps tendon at the patellar attachment without retraction.  Patient was subsequently sent to Dr. Rodriguez and underwent surgical repair on 3/31/2025.  Patient was discharged from the hospital on 4/1/2025.  Since discharge with, the patient Aime that he has not had any significant pain.  Patient has been on aspirin for prophylaxis since discharge.  He has not had much pain he is only taken a couple of pain pills.  I ordered him a hospital bed with trapezius bar so that he can get in and out of bed and maneuver himself around.  He is still on braces.  He has been following with Dr. Blanton.  Patient unfortunately subsequently developed gout in his left great toe.  Patient does not have any significant history of gout.  It is podagra, it is enlarged, but is improving.  He indicated that it was red hot and he could not previously tolerated she to touch it.  The patient cannot do colchicine as there is data system to suggest that it impairs healing.  Patient cannot do steroids as it will impair healing of his quadriceps tendons.  I do not feel comfortable doing intra-articular steroids as inflamed as it looks at the present time.  I recommend the patient do cherry tart juice continue elevation continue ice.  I am going to check a CMP and a uric acid.  Usually I do not check uric acid when it is still flared I typically wait until it subsides to see what the baseline is, as during an acute flare the uric acid has crystallized and can be low,  normal, or high.  Patient has follow-up on 5/15 with Dr. Blanton.  He is hoping he can start physical therapy at the present time after that visit.    He can also put volteran on the joint  History of Present Illness  The patient presents today for follow-up. The chief complaint is injury to both legs sustained while getting out of a truck. Surgical repair with orthopedic instruments has been performed.    No improvement in the foot condition is reported. He was informed that no medication could be prescribed due to potential interference with the healing process of his knees. Pain management has been achieved by elevating and applying ice to the affected area. Lemon juice and water were applied to the area two days ago, which he believes has been beneficial. Initially, the pain was severe enough that he could not tolerate even a sheet on his foot. Rehabilitation is scheduled to start on 05/15/2025, but there is concern about potential complications. Cherry tart juice has been used previously for arthritis management and found helpful. The current pain is less severe than previous episodes. Ambulation as tolerated and engagement in outdoor activities such as sitting on the porch or spending time in the shop for approximately 30 minutes have been advised. Strength is improving, although leg weakness is experienced after prolonged standing. During the first week post-injury, walking was possible but missteps led to falls, necessitating dragging himself to a chair for support. No pain has been reported since the fall, except for one night when pain medication was administered post-surgery. Only three pain pills have been required since the incident. A bed equipped with a trapezius has significantly improved his quality of life.    PAST SURGICAL HISTORY: Surgical repair of both legs with orthopedic instruments.      Assessment & Plan  1. Gout.  Reports significant pain in the foot, consistent with gout. Cherry tart juice  and Tylenol recommended for pain relief; Voltaren gel suggested for topical application. Laboratory tests ordered to assess uric acid levels and kidney function. Preventive medication will be considered if uric acid levels are elevated once the current episode resolves.    2. Post-surgical recovery of bilateral leg injuries.  Continues to experience pain and difficulty with mobility following surgical repair. Advised to use ice and elevation to manage symptoms; cherry tart juice and Tylenol recommended for pain relief. Laboratory tests ordered to assess uric acid levels and kidney function. Encouraged to maintain upper body strength and gradually increase mobility to prepare for physical therapy starting on 05/15/2025.    Reviewed:MRI Knee Right Without Contrast (03/26/2025 17:10)  MRI Knee Left Without Contrast (03/26/2025 16:37)  XR Knee 1 or 2 View Right (03/22/2025 13:26)  XR Knee 1 or 2 View Left (03/22/2025 13:26)      Result Review :                               Follow Up   No follow-ups on file.  Patient was given instructions and counseling regarding his condition or for health maintenance advice. Please see specific information pulled into the AVS if appropriate.       RACIEL Guadarrama MD, FACP, [facility]      Electronically signed by Juan Guadarrama MD, 04/24/25, 6:18 PM CDT.    Patient or patient representative verbalized consent for the use of Ambient Listening during the visit with  Juan Guadarrama MD for chart documentation. 4/24/2025  18:23 CDT

## 2025-04-25 LAB
ALBUMIN SERPL-MCNC: 4.7 G/DL (ref 3.5–5.2)
ALBUMIN/GLOB SERPL: 2 G/DL
ALP SERPL-CCNC: 109 U/L (ref 39–117)
ALT SERPL-CCNC: 34 U/L (ref 1–41)
AST SERPL-CCNC: 24 U/L (ref 1–40)
BILIRUB SERPL-MCNC: 0.7 MG/DL (ref 0–1.2)
BUN SERPL-MCNC: 15 MG/DL (ref 8–23)
BUN/CREAT SERPL: 15.3 (ref 7–25)
CALCIUM SERPL-MCNC: 10.4 MG/DL (ref 8.6–10.5)
CHLORIDE SERPL-SCNC: 103 MMOL/L (ref 98–107)
CO2 SERPL-SCNC: 23.1 MMOL/L (ref 22–29)
CREAT SERPL-MCNC: 0.98 MG/DL (ref 0.76–1.27)
EGFRCR SERPLBLD CKD-EPI 2021: 87.2 ML/MIN/1.73
GLOBULIN SER CALC-MCNC: 2.3 GM/DL
GLUCOSE SERPL-MCNC: 96 MG/DL (ref 65–99)
POTASSIUM SERPL-SCNC: 4.4 MMOL/L (ref 3.5–5.2)
PROT SERPL-MCNC: 7 G/DL (ref 6–8.5)
SODIUM SERPL-SCNC: 139 MMOL/L (ref 136–145)
URATE SERPL-MCNC: 7.1 MG/DL (ref 3.4–7)

## 2025-05-07 ENCOUNTER — OFFICE VISIT (OUTPATIENT)
Dept: INTERNAL MEDICINE | Facility: CLINIC | Age: 65
End: 2025-05-07
Payer: COMMERCIAL

## 2025-05-07 VITALS
OXYGEN SATURATION: 98 % | WEIGHT: 250 LBS | HEART RATE: 82 BPM | RESPIRATION RATE: 14 BRPM | DIASTOLIC BLOOD PRESSURE: 92 MMHG | HEIGHT: 67 IN | TEMPERATURE: 99.5 F | SYSTOLIC BLOOD PRESSURE: 148 MMHG | BODY MASS INDEX: 39.24 KG/M2

## 2025-05-07 DIAGNOSIS — H60.502 ACUTE OTITIS EXTERNA OF LEFT EAR, UNSPECIFIED TYPE: Primary | ICD-10-CM

## 2025-05-07 DIAGNOSIS — R79.89 LOW TESTOSTERONE IN MALE: ICD-10-CM

## 2025-05-07 PROCEDURE — 96127 BRIEF EMOTIONAL/BEHAV ASSMT: CPT | Performed by: FAMILY MEDICINE

## 2025-05-07 PROCEDURE — 99213 OFFICE O/P EST LOW 20 MIN: CPT | Performed by: FAMILY MEDICINE

## 2025-05-07 RX ORDER — CLONAZEPAM 1 MG/1
1 TABLET ORAL
COMMUNITY
Start: 2025-05-02 | End: 2025-06-13

## 2025-05-07 RX ORDER — TESTOSTERONE CYPIONATE 200 MG/ML
INJECTION, SOLUTION INTRAMUSCULAR
Qty: 2 ML | Refills: 1 | Status: SHIPPED | OUTPATIENT
Start: 2025-05-07

## 2025-05-07 RX ORDER — CLINDAMYCIN HYDROCHLORIDE 300 MG/1
300 CAPSULE ORAL 3 TIMES DAILY
Qty: 21 CAPSULE | Refills: 0 | Status: SHIPPED | OUTPATIENT
Start: 2025-05-07 | End: 2025-05-14

## 2025-05-07 RX ORDER — ACETIC ACID 20.65 MG/ML
3 SOLUTION AURICULAR (OTIC) 3 TIMES DAILY
Qty: 15 ML | Refills: 0 | Status: SHIPPED | OUTPATIENT
Start: 2025-05-07

## 2025-05-07 NOTE — PROGRESS NOTES
Subjective     Chief Complaint   Patient presents with    Earache     Patient states having pain and hearing loss in left ear       History of Present Illness    Patient's PMR from outside medical facility reviewed and noted.    Mariano Al is a 64 y.o. male who presents for a sick visit today. The patient comes in complaining with symptoms of a Ear infection.   Patient reports Ear pain.  patient reports no facial pressure, sinus drainage, sore throat, and fever.  Patient states that he had a feeling of itching and fullness in the ear and had placed some head and shoulder shampoo there he states that since that time he states that it is gotten worse.  He states he has also been scratching at his ear significantly.  Patient reports that this has been going on for 3 days at this point.  Patient would like something to help with this at this time. The patient has not had sick contacts.     Due to large amounts of pus in the patient's ear lavage was necessary to clear the canal for better visualization        Past Medical History:   Past Medical History:   Diagnosis Date    Anxiety     Chronic kidney disease     Depression     Encounter for imaging to screen for eye metal prior to magnetic resonance imaging (MRI) 03/26/2025    No metallic foreign bodies seen in CT max/facial 04/13/2024 per Dr. Macias.    Headache     Hyperlipidemia     Hypertension     Hypogonadism in male     Mood disorder     Osteoarthritis     PTSD (post-traumatic stress disorder)     Sinusitis     Sleep disturbance     Traumatic brain injury      Past Surgical History:  Past Surgical History:   Procedure Laterality Date    COLONOSCOPY      HIP PERCUTANEOUS PINNING Left     JOINT REPLACEMENT Left     hip    QUADRICEPS TENDON REPAIR Bilateral 3/31/2025    Procedure: BILATERAL QUAD TENDON RUPTURE REPAIR ( BILATERAL QUAD TENDON REPAIR);  Surgeon: Dipesh Blanton MD;  Location: [facility];  Service: Orthopedics;  Laterality: Bilateral;      Social History:  reports that he has never smoked. He has never used smokeless tobacco. He reports current alcohol use of about 5.0 standard drinks of alcohol per week. He reports that he does not use drugs.    Family History: family history includes Heart disease in his father; Nephrolithiasis in his father.      Allergies:  Allergies   Allergen Reactions    Morphine Delirium     Triggers ptsd and does not wake up well from it    Penicillins Rash     Medications:  Prior to Admission medications    Medication Sig Start Date End Date Taking? Authorizing Provider   amLODIPine-benazepril (LOTREL 5-20) 5-20 MG per capsule Take 1 capsule by mouth once daily 3/26/25  Yes Juan Guadarrama MD   aspirin 325 MG EC tablet Take 1 tablet by mouth 2 (Two) Times a Day for 42 days. 3/31/25 5/12/25 Yes Dipesh Blanton MD   buPROPion XL (WELLBUTRIN XL) 150 MG 24 hr tablet Take 1 tablet by mouth Every Morning. 5/22/24  Yes Juan Guadarrama MD   busPIRone (BUSPAR) 10 MG tablet Take 1 tablet by mouth 3 (Three) Times a Day. 5/22/24  Yes Juan Guadarrama MD   HYDROcodone-acetaminophen (NORCO) 7.5-325 MG per tablet Take 1 tablet by mouth Every 4 (Four) Hours As Needed for Moderate Pain (Pain). 3/31/25  Yes Dipesh Blanton MD   hydroxychloroquine (PLAQUENIL) 200 MG tablet Take 1 tablet by mouth Every 12 (Twelve) Hours. 2/28/25  Yes Hamlet Oakley MD   multivitamin with minerals (MENS MULTIVITAMIN PO) Take 1 tablet by mouth Daily.   Yes Hamlet Oakley MD   rosuvastatin (CRESTOR) 10 MG tablet Take 1 tablet by mouth Every Night. 10/4/24  Yes Juan Guadarrama MD   sertraline (ZOLOFT) 50 MG tablet Take 1 tablet by mouth Daily. 5/22/24  Yes Juan Guadarrama MD   Testosterone Cypionate (DEPOTESTOTERONE CYPIONATE) 200 MG/ML injection INJECT 1 ML INTO THE APPROPRIATE MUSCLE ONCE EVERY 14 DAYS AS DIRECTED BY PRESCRIBER 3/14/25  Yes Melony Hills C, APRN   vitamin D (ERGOCALCIFEROL) 1.25 MG (55900 UT) capsule  "capsule Take 1 capsule by mouth 1 (One) Time Per Week. 5/22/24  Yes Juan Guadarrama MD   clonazePAM (KlonoPIN) 1 MG tablet Take 1 tablet by mouth.  Patient not taking: Reported on 5/7/2025 5/2/25 6/13/25  ProviderHamlet MD   cyclobenzaprine (FLEXERIL) 10 MG tablet Take 1 tablet by mouth 3 (Three) Times a Day As Needed for Muscle Spasms.  Patient not taking: Reported on 5/7/2025 3/31/25   Dipesh Blanton MD   divalproex (DEPAKOTE ER) 500 MG 24 hr tablet Take 1 tablet by mouth Daily.  Patient not taking: Reported on 5/7/2025 5/17/24   Kevin Wood APRN   docusate sodium (COLACE) 100 MG capsule Take 1 capsule by mouth 2 (Two) Times a Day.  Patient not taking: Reported on 5/7/2025 3/31/25   Dipesh Blanton MD   famotidine (PEPCID) 20 MG tablet Take 1 tablet by mouth Daily.  Patient not taking: Reported on 5/7/2025 5/22/24   Juan Guadarrama MD   ondansetron (Zofran) 4 MG tablet Take 1 tablet by mouth Every 8 (Eight) Hours As Needed for Nausea or Vomiting.  Patient not taking: Reported on 5/7/2025 3/31/25   Dipesh Blanton MD   Unable to find Diclofenac 4%, Bupivacaine 2%, Gabapentin 10%, Ibuprofen 3%, Cyclobenzaprine 2%, with DMSO  Apply 1-2 grams (1-2 pumps) to affected area 3-4 times daily  Compounded thru [facility]  Patient not taking: Reported on 5/7/2025 2/26/25   Juan Guadarrama MD       WESTLEY:      PHQ:  Little interest or pleasure in doing things? Not at all   Feeling down, depressed, or hopeless? Not at all   PHQ-2 Total Score 0         0 (Negative screening for depression)  Support given, observe for worsening symptoms        Review of systems   negative unless otherwise specified above in HPI    Objective     Vital Signs: /92 (BP Location: Left arm, Patient Position: Sitting, Cuff Size: Adult)   Pulse 82   Temp 99.5 °F (37.5 °C)   Resp 14   Ht 170.2 cm (67.01\")   Wt 113 kg (250 lb) Comment: Cole Reported  SpO2 98%   BMI 39.15 kg/m²     Physical " Exam  Vitals and nursing note reviewed.   Constitutional:       General: He is not in acute distress.     Appearance: Normal appearance.   HENT:      Head: Normocephalic.      Left Ear: Decreased hearing noted. Laceration, drainage, swelling and tenderness present.   Eyes:      Extraocular Movements: Extraocular movements intact.      Pupils: Pupils are equal, round, and reactive to light.   Cardiovascular:      Rate and Rhythm: Normal rate and regular rhythm.      Heart sounds: Normal heart sounds. No murmur heard.  Pulmonary:      Effort: Pulmonary effort is normal. No respiratory distress.      Breath sounds: Normal breath sounds. No rhonchi or rales.   Abdominal:      General: Abdomen is flat. Bowel sounds are normal.      Palpations: Abdomen is soft.   Neurological:      General: No focal deficit present.      Mental Status: He is alert.              Results Reviewed:  Glucose   Date Value Ref Range Status   04/24/2025 96 65 - 99 mg/dL Final   03/28/2025 106 (H) 65 - 99 mg/dL Final     BUN   Date Value Ref Range Status   04/24/2025 15 8 - 23 mg/dL Final   03/28/2025 13 8 - 23 mg/dL Final     Creatinine   Date Value Ref Range Status   04/24/2025 0.98 0.76 - 1.27 mg/dL Final   03/28/2025 1.02 0.76 - 1.27 mg/dL Final     Sodium   Date Value Ref Range Status   04/24/2025 139 136 - 145 mmol/L Final   03/28/2025 136 136 - 145 mmol/L Final     Potassium   Date Value Ref Range Status   04/24/2025 4.4 3.5 - 5.2 mmol/L Final   03/28/2025 4.4 3.5 - 5.2 mmol/L Final     Chloride   Date Value Ref Range Status   04/24/2025 103 98 - 107 mmol/L Final   03/28/2025 99 98 - 107 mmol/L Final     CO2   Date Value Ref Range Status   03/28/2025 26.0 22.0 - 29.0 mmol/L Final     Total CO2   Date Value Ref Range Status   04/24/2025 23.1 22.0 - 29.0 mmol/L Final     Calcium   Date Value Ref Range Status   04/24/2025 10.4 8.6 - 10.5 mg/dL Final   03/28/2025 9.4 8.6 - 10.5 mg/dL Final     ALT (SGPT)   Date Value Ref Range Status    04/24/2025 34 1 - 41 U/L Final   04/13/2024 40 1 - 41 U/L Final     AST (SGOT)   Date Value Ref Range Status   04/24/2025 24 1 - 40 U/L Final   04/13/2024 27 1 - 40 U/L Final     WBC   Date Value Ref Range Status   03/28/2025 7.90 3.40 - 10.80 10*3/mm3 Final     Hematocrit   Date Value Ref Range Status   03/28/2025 43.9 37.5 - 51.0 % Final     Platelets   Date Value Ref Range Status   03/28/2025 199 140 - 450 10*3/mm3 Final     Triglycerides   Date Value Ref Range Status   02/17/2025 231 (H) 0 - 150 mg/dL Final     HDL Cholesterol   Date Value Ref Range Status   02/17/2025 44 40 - 60 mg/dL Final     LDL Chol Calc (NIH)   Date Value Ref Range Status   02/17/2025 85 0 - 100 mg/dL Final             Procedure   Procedures       Assessment / Plan     Assessment/Plan:   Diagnosis Plan   1. Acute otitis externa of left ear, unspecified type  clindamycin (Cleocin) 300 MG capsule    methylPREDNISolone Acetate suspension 80 mg    acetic acid (VOSOL) 2 % otic solution            Return if symptoms worsen or fail to improve. unless patient needs to be seen sooner or acute issues arise.      I have discussed the patient results/orders and and plan/recommendation with them at today's visit.      Signed by:    Goran Smiley MD Date: 05/07/25

## 2025-05-15 ENCOUNTER — OFFICE VISIT (OUTPATIENT)
Age: 65
End: 2025-05-15

## 2025-05-15 VITALS — BODY MASS INDEX: 31.39 KG/M2 | HEIGHT: 67 IN | WEIGHT: 200 LBS

## 2025-05-15 DIAGNOSIS — S76.112D RUPTURE OF LEFT QUADRICEPS TENDON, SUBSEQUENT ENCOUNTER: Primary | ICD-10-CM

## 2025-05-15 DIAGNOSIS — S76.111D RUPTURE OF RIGHT QUADRICEPS TENDON, SUBSEQUENT ENCOUNTER: ICD-10-CM

## 2025-05-15 PROCEDURE — 99024 POSTOP FOLLOW-UP VISIT: CPT | Performed by: ORTHOPAEDIC SURGERY

## 2025-05-15 RX ORDER — CLONAZEPAM 1 MG/1
1 TABLET ORAL
COMMUNITY
Start: 2025-05-02 | End: 2025-06-14

## 2025-05-15 RX ORDER — CLINDAMYCIN HYDROCHLORIDE 300 MG/1
300 CAPSULE ORAL 3 TIMES DAILY
COMMUNITY
Start: 2025-05-07 | End: 2025-05-15

## 2025-05-15 ASSESSMENT — ENCOUNTER SYMPTOMS
RESPIRATORY NEGATIVE: 1
ALLERGIC/IMMUNOLOGIC NEGATIVE: 1
EYES NEGATIVE: 1
GASTROINTESTINAL NEGATIVE: 1

## 2025-05-15 NOTE — PROGRESS NOTES
[facility]  [facility]  [address]  [facility] [id number] [phone]       Hugo Gama (:  1960) is a 64 y.o. male,Established patient, here for evaluation of the following chief complaint(s):  Post-Op Check (Bilateral quad tendon repair  Sx: 3/31/2025)        Assessment & Plan  1. Bilateral quad tendon repair:    Gradually increase knee flexion starting with 30 degrees for the next 2 weeks, then progressing to 60 degrees for the subsequent 2 weeks, and finally reaching 90 degrees. Perform quad sets to enhance neuromuscular stimulation. A TENS unit will be provided for home use. Continue wearing braces for additional support.    Follow-up  Follow up in 6 weeks.       ICD-10-CM    1. Rupture of left quadriceps tendon, subsequent encounter  S76.112D       2. Rupture of right quadriceps tendon, subsequent encounter  S76.111D           Return in about 6 weeks (around 2025).    SUBJECTIVE/OBJECTIVE:  History of Present Illness  The patient presents for evaluation following bilateral quadriceps tendon repair.    He has been experiencing a challenging 6-week period, during which he has been unable to perform his usual activities such as mowing the lawn and cleaning bathrooms. He expresses a desire to resume these tasks in the coming weeks. He reports that his knees occasionally dislocate when he stretches them, necessitating manual adjustment. Despite these issues, he has been maintaining an active lifestyle, including nightly work in the shop and engaging in various activities. Additionally, he mentions a past incident involving muscle injury in his shoulder, where electrodes were used during rehabilitation. He does not possess a TENS unit.    SOCIAL HISTORY  Occupations: Works in a shop, mows lawns, cleans bathrooms  Exercise: Walking, working every night in the shop       Review of Systems   Constitutional: Negative.    HENT:

## 2025-05-16 ENCOUNTER — TELEPHONE (OUTPATIENT)
Age: 65
End: 2025-05-16

## 2025-05-19 NOTE — TELEPHONE ENCOUNTER
Pt's gf is calling on pt's behalf. She would like to speak to Liliya about pt's return back to work status and paperwork that needs to be filled out   Please call her back at [phone]    
Returned patients call, all questions answered.   
Home

## 2025-05-20 ENCOUNTER — TELEPHONE (OUTPATIENT)
Age: 65
End: 2025-05-20

## 2025-05-27 ENCOUNTER — TELEPHONE (OUTPATIENT)
Age: 65
End: 2025-05-27

## 2025-06-02 DIAGNOSIS — E55.9 VITAMIN D DEFICIENCY: ICD-10-CM

## 2025-06-02 RX ORDER — ERGOCALCIFEROL 1.25 MG/1
50000 CAPSULE, LIQUID FILLED ORAL WEEKLY
Qty: 12 CAPSULE | Refills: 3 | Status: SHIPPED | OUTPATIENT
Start: 2025-06-02

## 2025-06-25 ENCOUNTER — OFFICE VISIT (OUTPATIENT)
Age: 65
End: 2025-06-25
Payer: COMMERCIAL

## 2025-06-25 VITALS — WEIGHT: 205.6 LBS | HEIGHT: 67 IN | BODY MASS INDEX: 32.27 KG/M2

## 2025-06-25 DIAGNOSIS — S76.112D RUPTURE OF LEFT QUADRICEPS TENDON, SUBSEQUENT ENCOUNTER: Primary | ICD-10-CM

## 2025-06-25 DIAGNOSIS — S76.111D RUPTURE OF RIGHT QUADRICEPS TENDON, SUBSEQUENT ENCOUNTER: ICD-10-CM

## 2025-06-25 PROCEDURE — 99212 OFFICE O/P EST SF 10 MIN: CPT | Performed by: ORTHOPAEDIC SURGERY

## 2025-06-25 ASSESSMENT — ENCOUNTER SYMPTOMS
RESPIRATORY NEGATIVE: 1
EYES NEGATIVE: 1
GASTROINTESTINAL NEGATIVE: 1
ALLERGIC/IMMUNOLOGIC NEGATIVE: 1

## 2025-06-25 NOTE — PROGRESS NOTES
[facility]  [facility]  [address]  [facility] [id number] [phone]       Hugo Gama (:  1960) is a 64 y.o. male,Established patient, here for evaluation of the following chief complaint(s):  Post-Op Check (Bilateral quad tendon repair  Sx: 3/31/2025)        Assessment & Plan  1. Knee evaluation:  Discontinuation of braces and commencement of ambulation are advised. A TENS unit will be provided to facilitate muscle activation, particularly the quadriceps, through e-stimulation. A note permitting return to work without restrictions has been issued. If difficulties are experienced within the next 10 weeks, contact the office immediately for potential referral to formal physical therapy.    Follow-up: The patient will follow up in 12 weeks.       ICD-10-CM    1. Rupture of left quadriceps tendon, subsequent encounter  S76.112D       2. Rupture of right quadriceps tendon, subsequent encounter  S76.111D           Return in about 12 weeks (around 2025) for NX 6 month PO Bilat quad tendon repair .    SUBJECTIVE/OBJECTIVE:  History of Present Illness  The patient presents for evaluation of his knee.    He has been diligently performing straight leg raises on both sides every night for the past few months, gradually increasing to 3 sets of 10 repetitions. He also engages in other exercises during the night, including holding his knee up and extending it fully before lowering it. His nightly therapy routine includes walking up stairs. However, he expresses apprehension about descending hills. He does not currently utilize a TENS unit. 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    SOCIAL HISTORY  Occupations: Riding a mower, cleaning bathrooms  Exercise: Straight leg raises every night, walking up steps       Review of Systems

## 2025-08-27 DIAGNOSIS — Z00.00 WELLNESS EXAMINATION: ICD-10-CM

## 2025-08-27 DIAGNOSIS — E78.5 HYPERLIPIDEMIA, UNSPECIFIED HYPERLIPIDEMIA TYPE: ICD-10-CM

## 2025-08-27 DIAGNOSIS — I10 PRIMARY HYPERTENSION: ICD-10-CM

## 2025-08-27 DIAGNOSIS — Z12.5 SCREENING PSA (PROSTATE SPECIFIC ANTIGEN): ICD-10-CM

## 2025-08-27 DIAGNOSIS — E55.9 VITAMIN D DEFICIENCY: ICD-10-CM

## (undated) DEVICE — ANTIBACTERIAL UNDYED BRAIDED (POLYGLACTIN 910), SYNTHETIC ABSORBABLE SUTURE: Brand: COATED VICRYL

## (undated) DEVICE — SHORT LENS-STERILE

## (undated) DEVICE — DISPOSABLE TOURNIQUET CUFF 34"X4", 1-LINE, BLUE, STERILE, 1EA/PK, 10PK/CS: Brand: ASP MEDICAL

## (undated) DEVICE — 4-PORT MANIFOLD: Brand: NEPTUNE 2

## (undated) DEVICE — GLV SURG SENSICARE PI ORTHO SZ8.5 LF STRL

## (undated) DEVICE — CVR UNIV C/ARM

## (undated) DEVICE — GLV SURG DERMASSURE GRN LF PF 8.5

## (undated) DEVICE — PROXIMATE RH ROTATING HEAD SKIN STAPLERS (35 WIDE) CONTAINS 35 STAINLESS STEEL STAPLES: Brand: PROXIMATE

## (undated) DEVICE — CVR BRD ARM 13X30

## (undated) DEVICE — TRAP FLD MINIVAC MEGADYNE 100ML

## (undated) DEVICE — PK EXTRM 30

## (undated) DEVICE — PATIENT RETURN ELECTRODE, SINGLE-USE, CONTACT QUALITY MONITORING, ADULT, WITH 9FT CORD, FOR PATIENTS WEIGING OVER 33LBS. (15KG): Brand: MEGADYNE

## (undated) DEVICE — SYS SKIN EXOFIN WND CLS 4X22CM